# Patient Record
Sex: FEMALE | Race: WHITE | Employment: OTHER | URBAN - METROPOLITAN AREA
[De-identification: names, ages, dates, MRNs, and addresses within clinical notes are randomized per-mention and may not be internally consistent; named-entity substitution may affect disease eponyms.]

---

## 2017-03-13 ENCOUNTER — OFFICE VISIT (OUTPATIENT)
Dept: INTERNAL MEDICINE | Age: 67
End: 2017-03-13

## 2017-03-13 VITALS
SYSTOLIC BLOOD PRESSURE: 100 MMHG | RESPIRATION RATE: 12 BRPM | WEIGHT: 164 LBS | HEIGHT: 65 IN | DIASTOLIC BLOOD PRESSURE: 60 MMHG | BODY MASS INDEX: 27.32 KG/M2

## 2017-03-13 DIAGNOSIS — Z13.820 SCREENING FOR OSTEOPOROSIS: ICD-10-CM

## 2017-03-13 DIAGNOSIS — Z00.00 ROUTINE GENERAL MEDICAL EXAMINATION AT A HEALTH CARE FACILITY: Primary | ICD-10-CM

## 2017-03-13 DIAGNOSIS — E78.5 HYPERLIPIDEMIA, UNSPECIFIED HYPERLIPIDEMIA TYPE: ICD-10-CM

## 2017-03-13 DIAGNOSIS — Z12.11 SPECIAL SCREENING FOR MALIGNANT NEOPLASMS, COLON: ICD-10-CM

## 2017-03-13 DIAGNOSIS — R53.83 OTHER FATIGUE: ICD-10-CM

## 2017-03-13 DIAGNOSIS — Z00.00 PERIODIC HEALTH ASSESSMENT, GENERAL SCREENING, ADULT: ICD-10-CM

## 2017-03-13 LAB
BILIRUBIN, POC: NORMAL
BLOOD URINE, POC: NORMAL
CLARITY, POC: CLEAR
COLOR, POC: YELLOW
GLUCOSE URINE, POC: NORMAL
KETONES, POC: NORMAL
LEUKOCYTE EST, POC: NORMAL
NITRITE, POC: NORMAL
PH, POC: 5
PROTEIN, POC: NORMAL
SPECIFIC GRAVITY, POC: 1.02
UROBILINOGEN, POC: NORMAL

## 2017-03-13 PROCEDURE — 3288F FALL RISK ASSESSMENT DOCD: CPT | Performed by: INTERNAL MEDICINE

## 2017-03-13 PROCEDURE — 99397 PER PM REEVAL EST PAT 65+ YR: CPT | Performed by: INTERNAL MEDICINE

## 2017-03-13 PROCEDURE — 81002 URINALYSIS NONAUTO W/O SCOPE: CPT | Performed by: INTERNAL MEDICINE

## 2017-03-13 PROCEDURE — G8510 SCR DEP NEG, NO PLAN REQD: HCPCS | Performed by: INTERNAL MEDICINE

## 2017-03-13 PROCEDURE — 93000 ELECTROCARDIOGRAM COMPLETE: CPT | Performed by: INTERNAL MEDICINE

## 2017-03-13 ASSESSMENT — PATIENT HEALTH QUESTIONNAIRE - PHQ9
SUM OF ALL RESPONSES TO PHQ9 QUESTIONS 1 & 2: 0
1. LITTLE INTEREST OR PLEASURE IN DOING THINGS: 0
SUM OF ALL RESPONSES TO PHQ QUESTIONS 1-9: 0
2. FEELING DOWN, DEPRESSED OR HOPELESS: 0

## 2017-03-15 DIAGNOSIS — Z00.00 PERIODIC HEALTH ASSESSMENT, GENERAL SCREENING, ADULT: ICD-10-CM

## 2017-03-15 DIAGNOSIS — E78.5 HYPERLIPIDEMIA, UNSPECIFIED HYPERLIPIDEMIA TYPE: ICD-10-CM

## 2017-03-15 DIAGNOSIS — R53.83 OTHER FATIGUE: ICD-10-CM

## 2017-03-15 LAB
A/G RATIO: 1.9 (ref 1.1–2.2)
ALBUMIN SERPL-MCNC: 4.6 G/DL (ref 3.4–5)
ALP BLD-CCNC: 69 U/L (ref 40–129)
ALT SERPL-CCNC: 22 U/L (ref 10–40)
ANION GAP SERPL CALCULATED.3IONS-SCNC: 14 MMOL/L (ref 3–16)
AST SERPL-CCNC: 24 U/L (ref 15–37)
BASOPHILS ABSOLUTE: 0.1 K/UL (ref 0–0.2)
BASOPHILS RELATIVE PERCENT: 2.5 %
BILIRUB SERPL-MCNC: 0.4 MG/DL (ref 0–1)
BUN BLDV-MCNC: 18 MG/DL (ref 7–20)
CALCIUM SERPL-MCNC: 9.6 MG/DL (ref 8.3–10.6)
CHLORIDE BLD-SCNC: 105 MMOL/L (ref 99–110)
CHOLESTEROL, TOTAL: 182 MG/DL (ref 0–199)
CO2: 25 MMOL/L (ref 21–32)
CREAT SERPL-MCNC: 0.9 MG/DL (ref 0.6–1.2)
EOSINOPHILS ABSOLUTE: 0.1 K/UL (ref 0–0.6)
EOSINOPHILS RELATIVE PERCENT: 3 %
GFR AFRICAN AMERICAN: >60
GFR NON-AFRICAN AMERICAN: >60
GLOBULIN: 2.4 G/DL
GLUCOSE BLD-MCNC: 94 MG/DL (ref 70–99)
HCT VFR BLD CALC: 42.3 % (ref 36–48)
HDLC SERPL-MCNC: 86 MG/DL (ref 40–60)
HEMOGLOBIN: 13.8 G/DL (ref 12–16)
HEPATITIS C ANTIBODY INTERPRETATION: NORMAL
LDL CHOLESTEROL CALCULATED: 79 MG/DL
LYMPHOCYTES ABSOLUTE: 1.6 K/UL (ref 1–5.1)
LYMPHOCYTES RELATIVE PERCENT: 35.5 %
MCH RBC QN AUTO: 29.6 PG (ref 26–34)
MCHC RBC AUTO-ENTMCNC: 32.5 G/DL (ref 31–36)
MCV RBC AUTO: 91 FL (ref 80–100)
MONOCYTES ABSOLUTE: 0.3 K/UL (ref 0–1.3)
MONOCYTES RELATIVE PERCENT: 5.6 %
NEUTROPHILS ABSOLUTE: 2.5 K/UL (ref 1.7–7.7)
NEUTROPHILS RELATIVE PERCENT: 53.4 %
PDW BLD-RTO: 13.1 % (ref 12.4–15.4)
PLATELET # BLD: 182 K/UL (ref 135–450)
PMV BLD AUTO: 8.8 FL (ref 5–10.5)
POTASSIUM SERPL-SCNC: 4.4 MMOL/L (ref 3.5–5.1)
RBC # BLD: 4.64 M/UL (ref 4–5.2)
SODIUM BLD-SCNC: 144 MMOL/L (ref 136–145)
TOTAL CK: 120 U/L (ref 26–192)
TOTAL PROTEIN: 7 G/DL (ref 6.4–8.2)
TRIGL SERPL-MCNC: 84 MG/DL (ref 0–150)
TSH SERPL DL<=0.05 MIU/L-ACNC: 1.88 UIU/ML (ref 0.27–4.2)
VLDLC SERPL CALC-MCNC: 17 MG/DL
WBC # BLD: 4.6 K/UL (ref 4–11)

## 2017-03-16 LAB — HIV-1 AND HIV-2 ANTIBODIES: NORMAL

## 2017-06-09 ENCOUNTER — TELEPHONE (OUTPATIENT)
Dept: INTERNAL MEDICINE | Age: 67
End: 2017-06-09

## 2017-06-09 RX ORDER — SIMVASTATIN 20 MG
TABLET ORAL
Qty: 90 TABLET | Refills: 3 | Status: SHIPPED | OUTPATIENT
Start: 2017-06-09 | End: 2017-10-18 | Stop reason: ALTCHOICE

## 2017-06-21 ENCOUNTER — OFFICE VISIT (OUTPATIENT)
Dept: SLEEP MEDICINE | Age: 67
End: 2017-06-21

## 2017-06-21 VITALS
DIASTOLIC BLOOD PRESSURE: 78 MMHG | OXYGEN SATURATION: 98 % | WEIGHT: 162.8 LBS | SYSTOLIC BLOOD PRESSURE: 122 MMHG | RESPIRATION RATE: 16 BRPM | HEIGHT: 65 IN | BODY MASS INDEX: 27.12 KG/M2 | HEART RATE: 83 BPM

## 2017-06-21 DIAGNOSIS — R06.83 SNORING: Primary | ICD-10-CM

## 2017-06-21 DIAGNOSIS — R68.2 DRY MOUTH: ICD-10-CM

## 2017-06-21 DIAGNOSIS — R51.9 MORNING HEADACHE: ICD-10-CM

## 2017-06-21 DIAGNOSIS — R35.1 NOCTURIA: ICD-10-CM

## 2017-06-21 PROCEDURE — 99204 OFFICE O/P NEW MOD 45 MIN: CPT | Performed by: PSYCHIATRY & NEUROLOGY

## 2017-06-21 ASSESSMENT — SLEEP AND FATIGUE QUESTIONNAIRES
HOW LIKELY ARE YOU TO NOD OFF OR FALL ASLEEP IN A CAR, WHILE STOPPED FOR A FEW MINUTES IN TRAFFIC: 0
NECK CIRCUMFERENCE (INCHES): 14
HOW LIKELY ARE YOU TO NOD OFF OR FALL ASLEEP WHILE SITTING QUIETLY AFTER LUNCH WITHOUT ALCOHOL: 0
HOW LIKELY ARE YOU TO NOD OFF OR FALL ASLEEP WHILE SITTING AND TALKING TO SOMEONE: 0
HOW LIKELY ARE YOU TO NOD OFF OR FALL ASLEEP WHILE SITTING INACTIVE IN A PUBLIC PLACE: 0
ESS TOTAL SCORE: 0
HOW LIKELY ARE YOU TO NOD OFF OR FALL ASLEEP WHILE SITTING AND READING: 0
HOW LIKELY ARE YOU TO NOD OFF OR FALL ASLEEP WHILE LYING DOWN TO REST IN THE AFTERNOON WHEN CIRCUMSTANCES PERMIT: 0
HOW LIKELY ARE YOU TO NOD OFF OR FALL ASLEEP WHILE WATCHING TV: 0
HOW LIKELY ARE YOU TO NOD OFF OR FALL ASLEEP WHEN YOU ARE A PASSENGER IN A CAR FOR AN HOUR WITHOUT A BREAK: 0

## 2017-06-21 ASSESSMENT — ENCOUNTER SYMPTOMS
RESPIRATORY NEGATIVE: 1
GASTROINTESTINAL NEGATIVE: 1
EYES NEGATIVE: 1
ALLERGIC/IMMUNOLOGIC NEGATIVE: 1

## 2017-06-27 ENCOUNTER — TELEPHONE (OUTPATIENT)
Dept: PHARMACY | Facility: CLINIC | Age: 67
End: 2017-06-27

## 2017-07-05 ENCOUNTER — TELEPHONE (OUTPATIENT)
Dept: INTERNAL MEDICINE | Age: 67
End: 2017-07-05

## 2017-09-28 ENCOUNTER — HOSPITAL ENCOUNTER (OUTPATIENT)
Dept: OTHER | Age: 67
Discharge: HOME | End: 2017-09-28
Attending: INTERNAL MEDICINE | Admitting: INTERNAL MEDICINE

## 2017-09-28 ENCOUNTER — TELEPHONE (OUTPATIENT)
Dept: INTERNAL MEDICINE | Age: 67
End: 2017-09-28

## 2017-09-28 DIAGNOSIS — R52 PAIN: ICD-10-CM

## 2017-09-28 DIAGNOSIS — T14.90XA TRAUMA: ICD-10-CM

## 2017-09-28 DIAGNOSIS — R52 PAIN: Primary | ICD-10-CM

## 2017-10-13 ENCOUNTER — TELEPHONE (OUTPATIENT)
Dept: PHARMACY | Facility: CLINIC | Age: 67
End: 2017-10-13

## 2017-10-13 NOTE — TELEPHONE ENCOUNTER
CLINICAL PHARMACY NOTE - Adherence Review    Identified care gap per [employer]: simvastatin adherence  Per records, appears 90-day supply last filled on 06/09/2017  Additional notes: patient picked up on 06/12/2017    First attempt made to reach patient by telephone regarding adherence. Left voice message for patient to return clinician's phone call to [phone] or toll free [phone] option 7. Will continue to attempt to contact patient by telephone as appropriate.     Tammy Jose, PharmD  Clinical Pharmacy Specialist  O: [phone]  C: [address], [address]

## 2017-10-13 NOTE — LETTER
[address]  [address], [place]  Phone: [phone], option 7  Fax: [address]   [address] [place] [address]           10/16/17     Dear Suzette Kapoor,    We tried to reach you recently regarding your simvastatin, but were unable to reach you on the telephone. It appears that this medication has not been filled at proper times. We are concerned you may be missing doses or not taking it as directed. It is important that you take your medications regularly and try not to miss a single dose. We have on file that you are currently taking simvastatin 20mg once daily. If you are no longer taking this, or taking it differently, please call us at the number below so that we can discuss this and update your medication profile. Some ways to help you remember to take your medications are to use a pill box, set an alarm, and/or keep your medication near something that you do every day.     Sincerely,     Heaven Osorio, PharmD  [address]  Phone: [phone], option 7

## 2017-10-18 RX ORDER — PRAVASTATIN SODIUM 40 MG
40 TABLET ORAL DAILY
Qty: 90 TABLET | Refills: 3 | Status: SHIPPED | OUTPATIENT
Start: 2017-10-18 | End: 2019-08-29

## 2017-10-18 NOTE — TELEPHONE ENCOUNTER
CLINICAL PHARMACY NOTE - Adherence Review    Call placed to patient - notified her that Dr. Violetta Cotter approved pravastatin prescription and that it was sent to her pharmacy. Provided her with writer's contact information if she has any further questions.      Alexandr Iglesias, PharmD  Clinical Pharmacy Specialist  O: [phone]  C: [address], [address]

## 2017-12-14 RX ORDER — SIMVASTATIN 20 MG
20 TABLET ORAL NIGHTLY
COMMUNITY
End: 2017-12-14 | Stop reason: SDUPTHER

## 2017-12-14 RX ORDER — SIMVASTATIN 20 MG
20 TABLET ORAL NIGHTLY
Qty: 90 TABLET | Refills: 3 | Status: SHIPPED | OUTPATIENT
Start: 2017-12-14 | End: 2019-02-28 | Stop reason: SDUPTHER

## 2018-01-22 ENCOUNTER — TELEPHONE (OUTPATIENT)
Dept: INTERNAL MEDICINE | Age: 68
End: 2018-01-22

## 2018-01-22 NOTE — TELEPHONE ENCOUNTER
For 4 days so much coughing but can't get the congestion out of her chest   On mucinex   She is tired and just wants to sleep   [facility]  RX# [phone]

## 2018-01-24 ENCOUNTER — TELEPHONE (OUTPATIENT)
Dept: INTERNAL MEDICINE | Age: 68
End: 2018-01-24

## 2018-01-24 DIAGNOSIS — R05.9 COUGH: Primary | ICD-10-CM

## 2018-01-25 ENCOUNTER — TELEPHONE (OUTPATIENT)
Dept: INTERNAL MEDICINE | Age: 68
End: 2018-01-25

## 2018-01-25 NOTE — TELEPHONE ENCOUNTER
Fine during the day. Just at night starts with a hacking cough. Can't get out the phlegm. She slept fine. Has been taking Robitussin DM regularly. Feels okay but this cough starts towards evening. Doesn't feel that an x-ray is needed - a waste of money.

## 2018-01-26 ENCOUNTER — HOSPITAL ENCOUNTER (OUTPATIENT)
Dept: OTHER | Age: 68
Discharge: HOME | End: 2018-01-26
Attending: INTERNAL MEDICINE | Admitting: INTERNAL MEDICINE

## 2018-01-26 DIAGNOSIS — R05.9 COUGH: ICD-10-CM

## 2018-04-23 ENCOUNTER — OFFICE VISIT (OUTPATIENT)
Dept: INTERNAL MEDICINE | Age: 68
End: 2018-04-23

## 2018-04-23 VITALS
HEIGHT: 65 IN | WEIGHT: 162 LBS | HEART RATE: 90 BPM | BODY MASS INDEX: 26.99 KG/M2 | SYSTOLIC BLOOD PRESSURE: 110 MMHG | RESPIRATION RATE: 12 BRPM | DIASTOLIC BLOOD PRESSURE: 78 MMHG

## 2018-04-23 DIAGNOSIS — Z13.820 SCREENING FOR OSTEOPOROSIS: ICD-10-CM

## 2018-04-23 DIAGNOSIS — Z00.00 MEDICARE ANNUAL WELLNESS VISIT, SUBSEQUENT: Primary | ICD-10-CM

## 2018-04-23 DIAGNOSIS — Z00.00 PERIODIC HEALTH ASSESSMENT, GENERAL SCREENING, ADULT: ICD-10-CM

## 2018-04-23 DIAGNOSIS — Z13.29 SCREENING FOR THYROID DISORDER: ICD-10-CM

## 2018-04-23 DIAGNOSIS — Z78.0 MENOPAUSE: ICD-10-CM

## 2018-04-23 DIAGNOSIS — Z12.11 SPECIAL SCREENING FOR MALIGNANT NEOPLASMS, COLON: ICD-10-CM

## 2018-04-23 DIAGNOSIS — E78.5 HYPERLIPIDEMIA, UNSPECIFIED HYPERLIPIDEMIA TYPE: ICD-10-CM

## 2018-04-23 PROCEDURE — G0439 PPPS, SUBSEQ VISIT: HCPCS | Performed by: INTERNAL MEDICINE

## 2018-04-23 PROCEDURE — 81002 URINALYSIS NONAUTO W/O SCOPE: CPT | Performed by: INTERNAL MEDICINE

## 2018-04-23 PROCEDURE — 93000 ELECTROCARDIOGRAM COMPLETE: CPT | Performed by: INTERNAL MEDICINE

## 2018-04-23 ASSESSMENT — LIFESTYLE VARIABLES
AUDIT-C TOTAL SCORE: 3
HOW OFTEN DURING THE LAST YEAR HAVE YOU BEEN UNABLE TO REMEMBER WHAT HAPPENED THE NIGHT BEFORE BECAUSE YOU HAD BEEN DRINKING: 0
HOW MANY STANDARD DRINKS CONTAINING ALCOHOL DO YOU HAVE ON A TYPICAL DAY: 0
HOW OFTEN DURING THE LAST YEAR HAVE YOU NEEDED AN ALCOHOLIC DRINK FIRST THING IN THE MORNING TO GET YOURSELF GOING AFTER A NIGHT OF HEAVY DRINKING: 0
HOW OFTEN DURING THE LAST YEAR HAVE YOU FAILED TO DO WHAT WAS NORMALLY EXPECTED FROM YOU BECAUSE OF DRINKING: 0
AUDIT TOTAL SCORE: 3
HAS A RELATIVE, FRIEND, DOCTOR, OR ANOTHER HEALTH PROFESSIONAL EXPRESSED CONCERN ABOUT YOUR DRINKING OR SUGGESTED YOU CUT DOWN: 0
HOW OFTEN DURING THE LAST YEAR HAVE YOU HAD A FEELING OF GUILT OR REMORSE AFTER DRINKING: 0
HOW OFTEN DO YOU HAVE SIX OR MORE DRINKS ON ONE OCCASION: 0
HOW OFTEN DO YOU HAVE A DRINK CONTAINING ALCOHOL: 3
HOW OFTEN DURING THE LAST YEAR HAVE YOU FOUND THAT YOU WERE NOT ABLE TO STOP DRINKING ONCE YOU HAD STARTED: 0
HAVE YOU OR SOMEONE ELSE BEEN INJURED AS A RESULT OF YOUR DRINKING: 0

## 2018-04-23 ASSESSMENT — PATIENT HEALTH QUESTIONNAIRE - PHQ9: SUM OF ALL RESPONSES TO PHQ QUESTIONS 1-9: 0

## 2018-04-23 ASSESSMENT — ANXIETY QUESTIONNAIRES: GAD7 TOTAL SCORE: 0

## 2018-05-14 ENCOUNTER — HOSPITAL ENCOUNTER (OUTPATIENT)
Dept: GENERAL RADIOLOGY | Age: 68
Discharge: HOME | End: 2018-05-14
Attending: INTERNAL MEDICINE | Admitting: INTERNAL MEDICINE

## 2018-05-14 DIAGNOSIS — Z78.0 ASYMPTOMATIC MENOPAUSAL STATE: ICD-10-CM

## 2018-05-14 DIAGNOSIS — Z78.0 MENOPAUSE: ICD-10-CM

## 2018-05-14 DIAGNOSIS — Z13.820 SCREENING FOR OSTEOPOROSIS: ICD-10-CM

## 2018-05-22 DIAGNOSIS — Z12.11 SPECIAL SCREENING FOR MALIGNANT NEOPLASMS, COLON: ICD-10-CM

## 2018-05-22 LAB
CONTROL: NORMAL
HEMOCCULT STL QL: NORMAL

## 2018-05-22 PROCEDURE — 82274 ASSAY TEST FOR BLOOD FECAL: CPT | Performed by: INTERNAL MEDICINE

## 2018-06-06 ENCOUNTER — NURSE TRIAGE (OUTPATIENT)
Dept: OTHER | Facility: CLINIC | Age: 68
End: 2018-06-06

## 2018-06-06 LAB
ALBUMIN SERPL-MCNC: 4.3 G/DL
ALP BLD-CCNC: 65 U/L
ALT SERPL-CCNC: 16 U/L
ANION GAP SERPL CALCULATED.3IONS-SCNC: NORMAL MMOL/L
AST SERPL-CCNC: 21 U/L
BASOPHILS ABSOLUTE: 0 /ΜL
BASOPHILS RELATIVE PERCENT: 1 %
BILIRUB SERPL-MCNC: 0.3 MG/DL (ref 0.1–1.4)
BUN BLDV-MCNC: 28 MG/DL
CALCIUM SERPL-MCNC: 9.5 MG/DL
CHLORIDE BLD-SCNC: 103 MMOL/L
CHOLESTEROL, TOTAL: 170 MG/DL
CHOLESTEROL/HDL RATIO: NORMAL
CO2: 23 MMOL/L
CREAT SERPL-MCNC: 0.97 MG/DL
EOSINOPHILS ABSOLUTE: 0.2 /ΜL
EOSINOPHILS RELATIVE PERCENT: 4 %
GFR CALCULATED: NORMAL
GLUCOSE BLD-MCNC: 89 MG/DL
HCT VFR BLD CALC: 40.2 % (ref 36–46)
HDLC SERPL-MCNC: 70 MG/DL (ref 35–70)
HEMOGLOBIN: 13.1 G/DL (ref 12–16)
LDL CHOLESTEROL CALCULATED: 82 MG/DL (ref 0–160)
LYMPHOCYTES ABSOLUTE: 1.9 /ΜL
LYMPHOCYTES RELATIVE PERCENT: 44 %
MCH RBC QN AUTO: 29.5 PG
MCHC RBC AUTO-ENTMCNC: 32.6 G/DL
MCV RBC AUTO: 91 FL
MONOCYTES ABSOLUTE: 0.3 /ΜL
MONOCYTES RELATIVE PERCENT: 7 %
NEUTROPHILS ABSOLUTE: 1.9 /ΜL
NEUTROPHILS RELATIVE PERCENT: 44 %
PDW BLD-RTO: 13.3 %
PLATELET # BLD: 198 K/ΜL
PMV BLD AUTO: NORMAL FL
POTASSIUM SERPL-SCNC: 4.5 MMOL/L
RBC # BLD: 4.44 10^6/ΜL
SODIUM BLD-SCNC: 140 MMOL/L
TOTAL CK: 132 U/L
TOTAL PROTEIN: 6.6
TRIGL SERPL-MCNC: 90 MG/DL
TSH SERPL DL<=0.05 MIU/L-ACNC: 1.8 UIU/ML
VLDLC SERPL CALC-MCNC: 18 MG/DL
WBC # BLD: 4.4 10^3/ML

## 2018-06-08 DIAGNOSIS — E78.5 HYPERLIPIDEMIA, UNSPECIFIED HYPERLIPIDEMIA TYPE: ICD-10-CM

## 2018-06-08 DIAGNOSIS — Z13.29 SCREENING FOR THYROID DISORDER: ICD-10-CM

## 2018-09-30 NOTE — ADDENDUM NOTE
Encounter addended by: Magdy Toney on: 10/17/2017  7:06 AM<BR>    Actions taken: Letter status changed DISPLAY PLAN FREE TEXT

## 2018-11-21 ENCOUNTER — TELEPHONE (OUTPATIENT)
Dept: INTERNAL MEDICINE CLINIC | Age: 68
End: 2018-11-21

## 2019-02-28 RX ORDER — SIMVASTATIN 20 MG
TABLET ORAL
Qty: 90 TABLET | Refills: 2 | Status: SHIPPED | OUTPATIENT
Start: 2019-02-28 | End: 2020-01-27

## 2019-08-29 ENCOUNTER — TELEPHONE (OUTPATIENT)
Dept: PHARMACY | Facility: CLINIC | Age: 69
End: 2019-08-29

## 2019-08-29 ENCOUNTER — OFFICE VISIT (OUTPATIENT)
Dept: INTERNAL MEDICINE CLINIC | Age: 69
End: 2019-08-29
Payer: MEDICARE

## 2019-08-29 VITALS
WEIGHT: 161.4 LBS | RESPIRATION RATE: 12 BRPM | HEIGHT: 65 IN | DIASTOLIC BLOOD PRESSURE: 66 MMHG | SYSTOLIC BLOOD PRESSURE: 112 MMHG | BODY MASS INDEX: 26.89 KG/M2 | HEART RATE: 100 BPM

## 2019-08-29 DIAGNOSIS — E78.5 HYPERLIPIDEMIA, UNSPECIFIED HYPERLIPIDEMIA TYPE: ICD-10-CM

## 2019-08-29 DIAGNOSIS — Z00.00 MEDICARE ANNUAL WELLNESS VISIT, SUBSEQUENT: Primary | ICD-10-CM

## 2019-08-29 DIAGNOSIS — Z13.29 SCREENING FOR THYROID DISORDER: ICD-10-CM

## 2019-08-29 DIAGNOSIS — Z12.11 SPECIAL SCREENING FOR MALIGNANT NEOPLASMS, COLON: ICD-10-CM

## 2019-08-29 PROCEDURE — G0439 PPPS, SUBSEQ VISIT: HCPCS | Performed by: INTERNAL MEDICINE

## 2019-08-29 PROCEDURE — 81002 URINALYSIS NONAUTO W/O SCOPE: CPT | Performed by: INTERNAL MEDICINE

## 2019-08-29 PROCEDURE — 93000 ELECTROCARDIOGRAM COMPLETE: CPT | Performed by: INTERNAL MEDICINE

## 2019-08-29 ASSESSMENT — LIFESTYLE VARIABLES
HOW OFTEN DURING THE LAST YEAR HAVE YOU HAD A FEELING OF GUILT OR REMORSE AFTER DRINKING: 0
AUDIT-C TOTAL SCORE: 3
HOW OFTEN DURING THE LAST YEAR HAVE YOU BEEN UNABLE TO REMEMBER WHAT HAPPENED THE NIGHT BEFORE BECAUSE YOU HAD BEEN DRINKING: 0
HOW OFTEN DURING THE LAST YEAR HAVE YOU NEEDED AN ALCOHOLIC DRINK FIRST THING IN THE MORNING TO GET YOURSELF GOING AFTER A NIGHT OF HEAVY DRINKING: 0
HOW MANY STANDARD DRINKS CONTAINING ALCOHOL DO YOU HAVE ON A TYPICAL DAY: 0
HAS A RELATIVE, FRIEND, DOCTOR, OR ANOTHER HEALTH PROFESSIONAL EXPRESSED CONCERN ABOUT YOUR DRINKING OR SUGGESTED YOU CUT DOWN: 0
HOW OFTEN DO YOU HAVE SIX OR MORE DRINKS ON ONE OCCASION: 0
HOW OFTEN DURING THE LAST YEAR HAVE YOU FOUND THAT YOU WERE NOT ABLE TO STOP DRINKING ONCE YOU HAD STARTED: 0
HAVE YOU OR SOMEONE ELSE BEEN INJURED AS A RESULT OF YOUR DRINKING: 0
HOW OFTEN DURING THE LAST YEAR HAVE YOU FAILED TO DO WHAT WAS NORMALLY EXPECTED FROM YOU BECAUSE OF DRINKING: 0
HOW OFTEN DO YOU HAVE A DRINK CONTAINING ALCOHOL: 3
AUDIT TOTAL SCORE: 3

## 2019-08-29 ASSESSMENT — PATIENT HEALTH QUESTIONNAIRE - PHQ9
SUM OF ALL RESPONSES TO PHQ QUESTIONS 1-9: 0
SUM OF ALL RESPONSES TO PHQ QUESTIONS 1-9: 0

## 2019-09-22 ENCOUNTER — TELEPHONE (OUTPATIENT)
Dept: INTERNAL MEDICINE CLINIC | Age: 69
End: 2019-09-22

## 2019-09-26 LAB
ALBUMIN SERPL-MCNC: 4.5 G/DL
ALP BLD-CCNC: 69 U/L
ALT SERPL-CCNC: 16 U/L
ANION GAP SERPL CALCULATED.3IONS-SCNC: NORMAL MMOL/L
AST SERPL-CCNC: 21 U/L
BASOPHILS ABSOLUTE: 0.1 /ΜL
BASOPHILS RELATIVE PERCENT: 1 %
BILIRUB SERPL-MCNC: 0.3 MG/DL (ref 0.1–1.4)
BUN BLDV-MCNC: 26 MG/DL
CALCIUM SERPL-MCNC: 9.7 MG/DL
CHLORIDE BLD-SCNC: 105 MMOL/L
CHOLESTEROL, TOTAL: 177 MG/DL
CHOLESTEROL/HDL RATIO: ABNORMAL
CO2: 23 MMOL/L
CREAT SERPL-MCNC: 1.09 MG/DL
EOSINOPHILS ABSOLUTE: 0.2 /ΜL
EOSINOPHILS RELATIVE PERCENT: 5 %
GFR CALCULATED: NORMAL
GLUCOSE BLD-MCNC: 95 MG/DL
HCT VFR BLD CALC: 38.8 % (ref 36–46)
HDLC SERPL-MCNC: 77 MG/DL (ref 35–70)
HEMOGLOBIN: 13.6 G/DL (ref 12–16)
LDL CHOLESTEROL CALCULATED: 85 MG/DL (ref 0–160)
LYMPHOCYTES ABSOLUTE: 1.5 /ΜL
LYMPHOCYTES RELATIVE PERCENT: 36 %
MCH RBC QN AUTO: 30.2 PG
MCHC RBC AUTO-ENTMCNC: 35.1 G/DL
MCV RBC AUTO: 86 FL
MONOCYTES ABSOLUTE: 0.3 /ΜL
MONOCYTES RELATIVE PERCENT: 6 %
NEUTROPHILS ABSOLUTE: 2.1 /ΜL
NEUTROPHILS RELATIVE PERCENT: 52 %
PDW BLD-RTO: 13 %
PLATELET # BLD: 199 K/ΜL
PMV BLD AUTO: NORMAL FL
POTASSIUM SERPL-SCNC: 4.5 MMOL/L
RBC # BLD: 4.5 10^6/ΜL
SODIUM BLD-SCNC: 143 MMOL/L
TOTAL CK: 132 U/L
TOTAL PROTEIN: 6.6
TRIGL SERPL-MCNC: 74 MG/DL
TSH SERPL DL<=0.05 MIU/L-ACNC: 2.05 UIU/ML
VLDLC SERPL CALC-MCNC: 15 MG/DL
WBC # BLD: 4.1 10^3/ML

## 2019-09-30 DIAGNOSIS — Z12.11 SPECIAL SCREENING FOR MALIGNANT NEOPLASMS, COLON: ICD-10-CM

## 2019-09-30 LAB
CONTROL: NORMAL
HEMOCCULT STL QL: NORMAL

## 2019-09-30 PROCEDURE — 82274 ASSAY TEST FOR BLOOD FECAL: CPT | Performed by: INTERNAL MEDICINE

## 2019-10-07 ENCOUNTER — TELEPHONE (OUTPATIENT)
Dept: PHARMACY | Facility: CLINIC | Age: 69
End: 2019-10-07

## 2019-10-10 DIAGNOSIS — E78.5 HYPERLIPIDEMIA, UNSPECIFIED HYPERLIPIDEMIA TYPE: ICD-10-CM

## 2019-10-10 DIAGNOSIS — Z13.29 SCREENING FOR THYROID DISORDER: ICD-10-CM

## 2020-01-27 RX ORDER — SIMVASTATIN 20 MG
TABLET ORAL
Qty: 90 TABLET | Refills: 3 | Status: SHIPPED | OUTPATIENT
Start: 2020-01-27 | End: 2021-01-26 | Stop reason: SDUPTHER

## 2020-06-25 ENCOUNTER — TELEPHONE (OUTPATIENT)
Dept: INTERNAL MEDICINE CLINIC | Age: 70
End: 2020-06-25

## 2020-07-27 ENCOUNTER — TELEPHONE (OUTPATIENT)
Dept: INTERNAL MEDICINE CLINIC | Age: 70
End: 2020-07-27

## 2020-07-27 NOTE — TELEPHONE ENCOUNTER
She will get x-rays on Friday.   Can she get a muscle relaxer (she said she was actually just taking tylenol and not a true muscle relaxer)  [facility] - [phone]

## 2020-07-27 NOTE — TELEPHONE ENCOUNTER
X 7-10 days   Woke up - left shoulder, neck area aching terribly. Then feels like needles and pins down left arm. Constant, dull aching. Took a muscle relaxer, lidocaine patches. Going out of town to visit her son for 3 days tomorrow.

## 2020-07-28 ENCOUNTER — HOSPITAL ENCOUNTER (OUTPATIENT)
Dept: GENERAL RADIOLOGY | Age: 70
Discharge: HOME | End: 2020-07-28
Payer: MEDICARE

## 2020-07-28 ENCOUNTER — HOSPITAL ENCOUNTER (OUTPATIENT)
Age: 70
Discharge: HOME | End: 2020-07-28
Payer: MEDICARE

## 2020-07-28 PROCEDURE — 73030 X-RAY EXAM OF SHOULDER: CPT

## 2020-07-28 PROCEDURE — 72040 X-RAY EXAM NECK SPINE 2-3 VW: CPT

## 2020-07-29 ENCOUNTER — OFFICE VISIT (OUTPATIENT)
Dept: ORTHOPEDIC SURGERY | Age: 70
End: 2020-07-29
Payer: MEDICARE

## 2020-07-29 ENCOUNTER — TELEPHONE (OUTPATIENT)
Dept: INTERNAL MEDICINE CLINIC | Age: 70
End: 2020-07-29

## 2020-07-29 VITALS — WEIGHT: 161.38 LBS | BODY MASS INDEX: 26.89 KG/M2 | TEMPERATURE: 97.3 F | HEIGHT: 65 IN

## 2020-07-29 PROCEDURE — 99203 OFFICE O/P NEW LOW 30 MIN: CPT | Performed by: ORTHOPAEDIC SURGERY

## 2020-07-29 RX ORDER — MELOXICAM 7.5 MG/1
7.5 TABLET ORAL DAILY
Qty: 30 TABLET | Refills: 2 | Status: SHIPPED | OUTPATIENT
Start: 2020-07-29 | End: 2020-08-27

## 2020-07-29 RX ORDER — METHYLPREDNISOLONE 4 MG/1
TABLET ORAL
Qty: 1 KIT | Refills: 0 | Status: SHIPPED | OUTPATIENT
Start: 2020-07-29 | End: 2020-08-04 | Stop reason: ALTCHOICE

## 2020-07-29 ASSESSMENT — ENCOUNTER SYMPTOMS: BACK PAIN: 1

## 2020-07-29 NOTE — PROGRESS NOTES
Review of Systems   Genitourinary:        Kidney stones / kidney disease    Musculoskeletal: Positive for back pain and neck pain. All other systems reviewed and are negative.

## 2020-07-29 NOTE — PROGRESS NOTES
[facility]  History and Physical  Shoulder Pain    Date:  2020    Name:  Kameron Gonzalez  Address:  [address] [place] [id number]    :  1950      Age:   72 y.o. Medical Record Number:  <[id number]>    Reason for Visit:    New Patient (left shoulder )      HPI:   Kameron Gonzalez is a very pleasant 72 y.o. female who presents to our office today with complaints of left shoulder pain. She has been kindly referred by her primary care physician Dr. Luisa Drummond for consultation regarding her left shoulder. Mrs. Dieudonne Mena describes a 2 year history of left shoulder \"discomfort\" which has gradually progressed, is aching in quality, is relatively mild in severity, and is exacerbated by physical activity while being alleviated by rest.      However, 7 to 10 days ago she experienced an atraumatic insidious onset exacerbation of left shoulder pain which prompted her to seek medical attention from Dr. Luisa Drummond. This subacute pain is described as moderate to severe in severity, aching and throbbing in quality, and localized from the left paraspinal region of her neck down laterally to just medial to the acromion. She describes a more dull pain that begins t around the superior aspect of the acromion and traveling down the lateral aspect of her upper arm. She furthermore describes a tingliness which travels from her left shoulder down to her fingers, although she cannot be more specific in localizing which fingers or which aspects of her forearm are experiencing this paresthesia. Pain Assessment  Location of Pain: Shoulder  Location Modifiers: Left  Severity of Pain: 4  Quality of Pain: Aching, Dull, Other (Comment)(tingling)  Duration of Pain: Persistent  Frequency of Pain: Constant  Aggravating Factors:  Other (Comment)(almost everything)  Relieving Factors: Nsaids, Rest(acetaminophen)  Result of Injury: No  Work-Related Injury: No  Are there other pain MOUTH ONCE NIGHTLY 90 tablet 3     No current facility-administered medications on file prior to visit. Social History     Socioeconomic History    Marital status:      Spouse name: Dorothea Horton Number of children: 10    Years of education: Not on file    Highest education level: Not on file   Occupational History    Occupation: works at [employer] strain: Not on file    Food insecurity     Worry: Not on file     Inability: Not on file   [employer] needs     Medical: Not on file     Non-medical: Not on file   Tobacco Use    Smoking status: Former Smoker     Packs/day: 1.00     Years: 20.00     Pack years: 20.00     Types: Cigarettes     Last attempt to quit: 1993     Years since quittin.5    Smokeless tobacco: Never Used   Substance and Sexual Activity    Alcohol use: Yes     Alcohol/week: 0.0 standard drinks     Comment: occasional    Drug use: Not on file    Sexual activity: Not on file   Lifestyle    Physical activity     Days per week: Not on file     Minutes per session: Not on file    Stress: Not on file   Relationships    Social connections     Talks on phone: Not on file     Gets together: Not on file     Attends Adventism service: Not on file     Active member of club or organization: Not on file     Attends meetings of clubs or organizations: Not on file     Relationship status: Not on file    Intimate partner violence     Fear of current or ex partner: Not on file     Emotionally abused: Not on file     Physically abused: Not on file     Forced sexual activity: Not on file   Other Topics Concern    Not on file   Social History Narrative    Living Will:  Yes. Caffeine:        SODA - 0          TEA - 0        COFFEE - 1-2 cups a day     Family History   Problem Relation Age of Onset    Heart Attack Father 52        , MI.    Hypertension Mother 80        , hypertension, dementia.        Current Medications: Current Outpatient Medications   Medication Sig Dispense Refill    simvastatin (ZOCOR) 20 MG tablet TAKE ONE TABLET BY MOUTH ONCE NIGHTLY 90 tablet 3     No current facility-administered medications for this visit. Allergies:  No Known Allergies    Physical Exam:  Vitals:    07/29/20 1414   Temp: 97.3 °F (36.3 °C)     General: Brennon Waters is a healthy and well appearing 72 y.o. female who is sitting comfortably in our office in no acute distress. General Exam:   Constitutional: Patient is adequately groomed with no evidence of malnutrition    Neuro: alert. Oriented X 3  Eyes: Extra-ocular muscles intact  Mouth: Oral mucosa moist. No perioral lesions  Pulm: Respirations unlabored and regular. Left shoulder Exam:  Inspection: No erythema, ecchymosis, lacerations/abrasions, gross deformities, or gross signs of infection. Palpation:  No significant tenderness to palpation of the [facility] joint, greater tuberosity, bicipital tendon. No tenderness with palpation of the midline spinal processes, bilateral paraspinal musculature, or trapezius muscles bilaterally. Range of Motion:   Active forward elevation: 150 degrees  Active and passive abduction: 130 and 140 degrees. Active external rotation with elbow at side 45 degrees  Active internal rotation to the back T12     Neck ROM: Reduced extension and bilateral rotation. No complaints of pain with range of motion of the neck. Strength: 4-/5 with resistance to abduction, 4/5 with resistance to external rotation, 4+/5 with resistance to internal rotation, 4-/5 Champagne Toast test    Special Tests: Positive Chad's. Negative Speeds and Yerguson's tests. Negative Conterras test.     Neurovascular:   Sensation 2/2 in distributions of C4-T2. Gross motor function intact of the distributions consistent with C5-T1.   2+ palpable radial pulse.       Comparison: Right shoulder Exam:  Inspection: No erythema, ecchymosis, lacerations/abrasions, gross deformities, or gross signs of infection. Palpation:  No tenderness to palpation of the [facility] joint, greater tuberosity, bicipital tendon    Range of Motion:   Active forward elevation: 160 degrees  Active and passive abduction: 150 and 160 degrees. Active external rotation with elbow at side 60 degrees  Active internal rotation to the back T10     Strength: 4+/5 with resistance to abduction, 4+/5 with resistance to external rotation, 5/5 with resistance to internal rotation, 4+/5 Champagne Toast test    Special Tests: Negative Chad's. Negative Speeds and Yerguson's tests. Neurovascular: Bilaterally symmetrical neurovascular examination as described above      Laboratory:  No visits with results within 14 Day(s) from this visit.    Latest known visit with results is:   Abstract on 10/10/2019   Component Date Value    Total CK 09/25/2019 132     TSH 09/25/2019 2.050     Cholesterol, Total 09/25/2019 177     HDL 09/25/2019 77*    LDL Calculated 09/25/2019 85     Triglycerides 09/25/2019 74     VLDL 09/25/2019 15     Sodium 09/25/2019 143     Chloride 09/25/2019 105     Potassium 09/25/2019 4.5     BUN 09/25/2019 26     CREATININE 09/25/2019 1.09     Glucose 09/25/2019 95     AST 09/25/2019 21     ALT 09/25/2019 16     Calcium 09/25/2019 9.7     Total Protein 09/25/2019 6.6     CO2 09/25/2019 23     Alb 09/25/2019 4.5     Alkaline Phosphatase 09/25/2019 69     Total Bilirubin 09/25/2019 0.3     WBC 09/25/2019 4.1     RBC 09/25/2019 4.50     Hemoglobin 09/25/2019 13.6     Hematocrit 09/25/2019 38.8     MCV 09/25/2019 86     MCH 09/25/2019 30.2     MCHC 09/25/2019 35.1     Platelets 59/95/4427 199     RDW 09/25/2019 13.0     Neutrophils % 09/25/2019 52     Lymphocytes % 09/25/2019 36     Monocytes % 09/25/2019 6     Eosinophils % 09/25/2019 5     Basophils % 09/25/2019 1     Neutrophils Absolute 09/25/2019 2.1     Lymphocytes Absolute 09/25/2019 1.5     Monocytes Absolute 09/25/2019 0.3     Eosinophils Absolute 09/25/2019 0.2     Basophils Absolute 09/25/2019 0.1       No results found for this or any previous visit (from the past 24 hour(s)). Radiographic:  Prior shoulder and C-spine x-rays are available from yesterday. Radiographic Impression: Advanced degenerative disc disease throughout the C-spine. Mild to moderate osteoarthritis of the left shoulder. Self assessment questionnaires including ASES and Simple Shoulder Test were completed today. Mendez Chung is a 72 y.o. female whose overall clinical picture is consistent with 2 superimposed diagnoses: Mild-moderate osteoarthritis of the left shoulder responsible for her chronic course of gradually progressive but relatively mild left shoulder pain, and a subacute onset of left upper extremity neuropathy consistent with the distributions of C4-T1 in the context of severe cervical spine degenerative disc disease. In addition to initiating conservative management to potentially benefit the symptomatology of both diagnoses, we have referred her to Dr. Juwan Mora for evaluation and management of her C-spine DDD and advised/prescribed additional conservative management for her left shoulder osteoarthritis. Impression:  Encounter Diagnoses   Name Primary?     Cervical spine pain Yes    Osteoarthritis of left shoulder, unspecified osteoarthritis type     Degenerative disc disease, cervical        Office Procedures:  Orders Placed This Encounter   Procedures   May Bedolla MD, Spine Surgery, [facility]     Referral Priority:   Routine     Referral Type:   Eval and Treat     Referral Reason:   Specialty Services Required     Referred to Provider:   Ubaldo Daniel MD     Requested Specialty:   Pain Management     Number of Visits Requested:   1    [facility] PT - [facility]     Referral Priority:   Routine     Referral Type:   Eval and Treat     Referral Reason:   Specialty Services Required Requested Specialty:   Physical Therapy     Number of Visits Requested:   1         Plan  -Refer to Dr. Randolph Alvarez for conservative evaluation and management of his C-spine DDD  -Medrol Dosepak to rapidly address the inflammatory component of both diagnoses  -Physical therapy for left shoulder osteoarthritis  -Voltaren topical NSAID  -Meloxicam p.o. NSAID  -Activity modification by limiting activities which provoke pain in her left shoulder or radiculopathy of the left upper extremity  -Follow-up in 1 month for clinical reevaluation and consideration of a left shoulder cortisone injection if she has not shown significant improvement for her shoulder      7/29/2020  3:05 PM      Marisa Zelaya MD  Fellow of [address] [place] [address] and [address] [place] [address]    During this examination, Maria Teresa Santos, fellow of orthopaedic surgery, functioned as a scribe for Dr. Khalif Modi. This dictation was performed with a verbal recognition program (DRAGON) and it was checked for errors. It is possible that there are still dictated errors within this office note. If so, please bring any errors to my attention for an addendum. All efforts were made to ensure that this office note is accurate.  ______________  I was physically present and personally supervised the Orthopaedic Sports Medicine Fellow in the evaluation and development of a treatment plan for this patient. I personally interviewed the patient and performed a physical examination. In addition, I discussed the patient's condition and treatment options with them. I have also reviewed and agree with the past medical, family and social history unless otherwise noted. All of the patient's questions were answered. Fernanda Modi MD, PhD  7/29/2020

## 2020-07-29 NOTE — TELEPHONE ENCOUNTER
Talked to patient. She decided to hold off on the steroids for now. Dr. Oanh Tolbert is able to see her today at 2:30.

## 2020-07-29 NOTE — LETTER
Shoulder Elbow Rehabilitation Referral    Patient Name: Laya Shaikh      YOB: 1950    Diagnosis: Left shoulder moderate osteoarthritis. Precautions: Note the patient also has C-spine degenerative disc disease responsible for left upper extremity paresthesia/radiculopathy which will be addressed by Dr. Verito Grimm    Date of Prescription: July 29, 2020      Stretching:     Strengthening:  [x] Four quadrant (FE, ER, IR, CBA)  [] Sleeper stretch    [x] Rotator cuff (ER, IR, Abd)  [x] Forward Elevation    [x] External Rotators     [x] External Rotation    [x] Internal Rotators  [x] Internal Rotation: up/back   [x] Abductors     [x] Internal Rotation: supine in abduction [x] Flexors  [x] Cross-body abduction    [x] Extensors  [] Pendulum (FE, Abd/Add, cw/ccw)  [x] Scapular Stabilizers   [] Wall-walking (FE, Abd)    [] Shoulder shrugs     [] Table slides      [] Rhomboid pinch  [x] Elbow (flex, ext, pron, sup)    [] Lat. Pull downs     [] Medial epicondylitis program    [] Forward punch   [] Lateral epicondylitis program    [] Internal rotators     [] Progressive resistive exercises  [] Bench Press        [] Bench press plus  Activities:     [] Lateral pull-downs  [] Rowing     [x] Progressive two-hand supine press  [] Stepper/Exercise bike   [] Biceps: curls/supination  [] Swimming  [] Water exercises    Modalities:     Return to Sport:  [] Ultrasound     [] Plyometrics  [] Iontophoresis    [] Rhythmic stabilization  [x] Moist heat     [] Core strengthening   [] Massage     [] Sports specific program:    [x] Cryotherapy      [] Electrical stimulation     [] Paraffin  [] Whirlpool  [] TENS    [x] Home exercise program (copy to patient).    Perform exercises for:  20-30 minutes, 2-3 times/day    [x] Supervised physical therapy  Frequency: []  1x week  [x] 2x week  [] 3x week  [] Other:   Duration: [] 2 weeks   [x] 4 weeks  [] 6 weeks  [] Other:     Additional Instructions

## 2020-07-31 ENCOUNTER — HOSPITAL ENCOUNTER (OUTPATIENT)
Dept: PHYSICAL THERAPY | Age: 70
Setting detail: THERAPIES SERIES
Discharge: HOME | End: 2020-07-31
Payer: MEDICARE

## 2020-07-31 PROCEDURE — 97161 PT EVAL LOW COMPLEX 20 MIN: CPT

## 2020-07-31 PROCEDURE — 97110 THERAPEUTIC EXERCISES: CPT

## 2020-07-31 PROCEDURE — 97530 THERAPEUTIC ACTIVITIES: CPT

## 2020-07-31 NOTE — FLOWSHEET NOTE
[facility], [place]      Physical Therapy Daily Treatment Note  Date:  2020    Patient Name:  Edith Odom    :  1950  MRN: [id number]  Medical/Treatment Diagnosis Information:  · Diagnosis: M19.012 (ICD-10-CM) - Osteoarthritis of left shoulder, unspecified osteoarthritis type  · Treatment Diagnosis: M25.512 Left shoulder pain  Insurance/Certification information:  PT Insurance Information: [employer]  [address] $ 40CP  Physician Information:  Referring Practitioner: Domingo Rosario  Has the plan of care been signed (Y/N):        []  Yes  [x]  No     Date of Patient follow up with Physician:       Is this a Progress Report:     []  Yes  [x]  No          Progress report will be due (10 Rx or 30 days whichever is less):       Recertification will be due (POC Duration  / 90 days whichever is less):          Visit # Insurance Allowable Auth Required   1 MN []  Yes []  No        Functional Scale: UEFI 8% ; NDI 8%     Date assessed:  20      Latex Allergy:  [x]NO      []YES  Preferred Language for Healthcare:   [x]English       []other:    Pain level:  6/10      SUBJECTIVE:  See eval    OBJECTIVE: See eval   Observation:    Test measurements:      ROM PROM AROM  Comment    L R L R    Flexion        Abduction        ER        IR        Other        Other             Strength L R Comment   Flexion      Abduction      ER      IR      Supraspinatus      Upper Trap      Lower Trap      Mid Trap      Rhomboids      Biceps      Triceps      Horizontal Abduction      Horizontal Adduction      Lats          RESTRICTIONS/PRECAUTIONS:     Exercises/Interventions:   Exercise/Equipment Resistance/Repetitions Other comments   Aerobic Conditioning     Aerodyne          Stretching/PROM     Wand     Table Slides     Wall slides  Flex 10x10     UE [place]olin     Pulleys     Pendulum     BB IR 10x10     SL IR     Pec doorway stretch     CBA stretch 10x10    UT stretch     LS stretch     Isometrics Retraction          Weight shift     Flexion     Abduction     External Rotation     Internal Rotation     Biceps     Triceps          PRE's     Flexion     Abduction     External Rotation 3x10 SL     Internal Rotation     Shrugs     EXT     Reverse Flys     Serratus 3x10     Horizontal Abd with ER     Biceps     Triceps     Retraction          Cable Column/Theraband     External Rotation     Internal Rotation     Shrugs     Lats     Ext     Flex     Scapular Retraction 3x10 blue     BIC     TRIC     PNF          Dynamic Stability          Plyoback                Therapeutic Exercise and NMR EXR  [] (78983) Provided verbal/tactile cueing for activities related to strengthening, flexibility, endurance, ROM  for improvements in scapular, scapulothoracic and UE control with self care, reaching, carrying, lifting, house/yardwork, driving/computer work.    [] (37415) Provided verbal/tactile cueing for activities related to improving balance, coordination, kinesthetic sense, posture, motor skill, proprioception  to assist with  scapular, scapulothoracic and UE control with self care, reaching, carrying, lifting, house/yardwork, driving/computer work. Therapeutic Activities:    [] (18029 or 95009) Provided verbal/tactile cueing for activities related to improving balance, coordination, kinesthetic sense, posture, motor skill, proprioception and motor activation to allow for proper function of scapular, scapulothoracic and UE control with self care, carrying, lifting, driving/computer work.      Home Exercise Program:    [x] (47513) Reviewed/Progressed HEP activities related to strengthening, flexibility, endurance, ROM of scapular, scapulothoracic and UE control with self care, reaching, carrying, lifting, house/yardwork, driving/computer work  [] (86674) Reviewed/Progressed HEP activities related to improving balance, coordination, kinesthetic sense, posture, motor skill, proprioception of scapular, scapulothoracic and UE control with self care, reaching, carrying, lifting, house/yardwork, driving/computer work      Manual Treatments:  PROM / STM / Oscillations-Mobs:  G-I, II, III, IV (Katherine, Inf., Post.)  [] (59118) Provided manual therapy to mobilize soft tissue/joints of cervical/CT, scapular GHJ and UE for the purpose of modulating pain, promoting relaxation,  increasing ROM, reducing/eliminating soft tissue swelling/inflammation/restriction, improving soft tissue extensibility and allowing for proper ROM for normal function with self care, reaching, carrying, lifting, house/yardwork, driving/computer work    Modalities:      Charges:  Timed Code Treatment Minutes: 27   Total Treatment Minutes: 45     [x] EVAL (LOW) 45080   [] EVAL (MOD) 08878   [] EVAL (HIGH) 12431   [] RE-EVAL   [x] KH(53273) x  1   [] IONTO  [] NMR (21121) x     [] VASO  [] Manual (12550) x      [] Other:  [x] TA x  1    [] Mech Traction (92013)  [] ES(attended) (01417)      [] ES (un) (02979):       GOALS  Patient stated goal:  Reduce pain, return to yoga and walking without restriction    [] Progressing: [] Met: [] Not Met: [] Adjusted    Therapist goals for Patient:   Short Term Goals: To be achieved in: 2 weeks  1. Independent in HEP and progression per patient tolerance, in order to prevent re-injury. [] Progressing: [] Met: [] Not Met: [] Adjusted   2. Patient will have a decrease in pain to facilitate improvement in movement, function, and ADLs as indicated by Functional Deficits. [] Progressing: [] Met: [] Not Met: [] Adjusted    Long Term Goals: To be achieved in: 4-6 weeks  1. Disability index score of 4% or less for the NDI/UEFI  to assist with reaching prior level of function. [] Progressing: [] Met: [] Not Met: [] Adjusted  2. Patient will demonstrate increased AROM to 160+ flex/abd, T10 or higher behind back IR   to allow for proper joint functioning as indicated by patients Functional Deficits.     [] Progressing:

## 2020-07-31 NOTE — PLAN OF CARE
[address] [address], [address] [place], [address]  Phone: [phone]   Fax: [phone]          Physical Therapy Certification    Dear Referring Practitioner: John Fernando,    We had the pleasure of evaluating the following patient for physical therapy services at [address] [place] [address]. A summary of our findings can be found in the initial assessment below. This includes our plan of care. If you have any questions or concerns regarding these findings, please do not hesitate to contact me at the office phone number checked above. Thank you for the referral.       Physician Signature:_______________________________Date:__________________  By signing above (or electronic signature), therapists plan is approved by physician      Patient: Samara Sosa   : 1950   MRN: [id number]  Referring Physician: Referring Practitioner: John Fernando      Evaluation Date: 2020      Medical Diagnosis Information:  Diagnosis: M19.012 (ICD-10-CM) - Osteoarthritis of left shoulder, unspecified osteoarthritis type   Treatment Diagnosis: M25.512 Left shoulder pain                                           Precautions/ Contra-indications:   Latex Allergy:  [x]NO      []YES  Preferred Language for Healthcare:   [x]English       []Other:    C-SSRS Triggered by Intake questionnaire (Past 2 wk assessment):   [x] No, Questionnaire did not trigger screening.   [] Yes, Patient intake triggered further evaluation      [] C-SSRS Screening completed  [] PCP notified via Plan of Care  [] Emergency services notified     SUBJECTIVE:   Per MD note : Samara Sosa is a very pleasant 72 y.o. female who presents to our office today with complaints of left shoulder pain. She has been kindly referred by her primary care physician Dr. Elidia Woodruff.   Mrs. Pricila Hernandez describes a 2 year history of left shoulder \"discomfort\" which has gradually progressed, is aching in quality, is relatively mild in severity, and is exacerbated by physical activity while being alleviated by rest.       However, 7 to 10 days ago she experienced an atraumatic insidious onset exacerbation of left shoulder pain which prompted her to seek medical attention from Dr. Raine Javed. This subacute pain is described as moderate to severe in severity, aching and throbbing in quality, and localized from the left paraspinal region of her neck down laterally to just medial to the acromion. She describes a more dull pain that begins t around the superior aspect of the acromion and traveling down the lateral aspect of her upper arm. She furthermore describes a tingliness which travels from her left shoulder down to her fingers, although she cannot be more specific in localizing which fingers or which aspects of her forearm are experiencing this paresthesia. Today: Pt reports that she woke up in the middle of the night with pain in the left shoulder about 2 weeks ago and she tried ice/heat/tylenol and nothing helped. pt was prescribed medrol dosepak on 7/29. She notes she feels much better with some tightness in the shoulders/neck. She notes prior to covid she was doing yoga 2x per week and would like to start back in a few weeks if its possible. She likes to stay active with silver sneakers and walking as well. She notes prior to to dosepak she had pain with all ADLs but she just pushed through it. She notes that she had tingling into hand but can't pinpoint specific movements or positions it happened in, notes it only happened occasionally and has since subsided since she began dosepak.      Relevant Medical History: OA   Functional Disability Index: NDI, UEFI    Pain Scale: 0/10 on dosepak, 6/10 at worst prior to   Easing factors: dosepak,   Provocative factors: reaching, lifting, carrying      Type: []Constant   []Intermittent  []Radiating []Localized []other:     Numbness/Tingling: mild into fingers but nonspecific    Functional Limitations/Impairments: []Lifting/reaching []Grooming []Carrying    []ADL's []Driving []Sports/Recreations   []Other:    Occupation/School:     Living Status/Prior Level of Function: Independent with ADLs and IADLs, walking daily, yoga, silver sneakers classes     OBJECTIVE:     CERV ROM     Cervical Flexion 40    Cervical Extension 40    Cervical SB 30 R   30 L     Cervical rotation     Reflexes/Sensation (myotomes/dermatomes): Intact     Joint mobility:    [x]Normal    []Hypo   []Hyper    Palpation: no TTP     Functional Mobility/Transfers:     Posture: Forward head, rounded shoulders     Bandages/Dressings/Incisions:  Na     Gait: (include devices/WB status)  wfl     Orthopedic Special Tests:     ROM PROM AROM  Comment    L R L R    Flexion   145 160    Abduction   150 160    ER        IR   L3 T6-7    Other        Other             Strength L R Comment   Flexion 4 4+    Abduction 4 4+    ER 4 4+    IR 4 4+    Supraspinatus      Upper Trap      Lower Trap      Mid Trap      Rhomboids      Biceps      Triceps      Horizontal Abduction      Horizontal Adduction      Lats        Special Tests Left Right   Apley Scratch IR:  ER:   Cross body: IR:  ER:  Cross Body:   Neer's     Full Can     Empty Can     Lelon Lesches     Nerve Tension Testing     Speed's     Kaiser's      Spurling's     Repeated Scaption                                       [x] Patient history, allergies, meds reviewed. Medical chart reviewed. See intake form. Review Of Systems (ROS):  [x]Performed Review of systems (Integumentary, CardioPulmonary, Neurological) by intake and observation. Intake form has been scanned into medical record. Patient has been instructed to contact their primary care physician regarding ROS issues if not already being addressed at this time.     Co-morbidities/Complexities (which will affect course of rehabilitation):   []None           Arthritic conditions   []Rheumatoid arthritis (M05.9)  []Osteoarthritis (M19.91)   Cardiovascular conditions   []Hypertension (I10)  []Hyperlipidemia (E78.5)  []Angina pectoris (I20)  []Atherosclerosis (I70)   Musculoskeletal conditions   []Disc pathology   []Congenital spine pathologies   []Prior surgical intervention  []Osteoporosis (M81.8)  []Osteopenia (M85.8)   Endocrine conditions   []Hypothyroid (E03.9)  []Hyperthyroid Gastrointestinal conditions   []Constipation (M03.70)   Metabolic conditions   []Morbid obesity (E66.01)  []Diabetes type 1(E10.65) or 2 (E11.65)   []Neuropathy (G60.9)     Pulmonary conditions   []Asthma (J45)  []Coughing   []COPD (J44.9)   Psychological Disorders  []Anxiety (F41.9)  []Depression (F32.9)   []Other:   []Other:          Barriers to/and or personal factors that will affect rehab potential:              []Age  []Sex              []Motivation/Lack of Motivation                        []Co-Morbidities              []Cognitive Function, education/learning barriers              []Environmental, home barriers              []profession/work barriers  []past PT/medical experience  []other:       Falls Risk Assessment (30 days):   [x] Falls Risk assessed and no intervention required.   [] Falls Risk assessed and Patient requires intervention due to being higher risk   TUG score (>12s at risk):     [] Falls education provided, including       Functional Assessment:    Functional Assessment scale used: UEFI; NDI   Score: 8%; 8%      ASSESSMENT:   Functional Impairments   []Noted spinal or UE joint hypomobility   []Noted spinal or UE joint hypermobility   [x]Decreased UE functional ROM   [x]Decreased UE functional strength   []Abnormal reflexes/sensation/myotomal/dermatomal deficits   [x]Decreased RC/scapular/core strength and neuromuscular control   []other:      Functional Activity Limitations (from functional questionnaire and intake)   [x]Reduced ability to tolerate prolonged functional positions   []Reduced ability or difficulty with changes of positions or transfers between positions   [x]Reduced ability to maintain good posture and demonstrate good body mechanics with sitting, bending, and lifting   [] Reduced ability or tolerance with driving and/or computer work   [x]Reduced ability to sleep   [x]Reduced ability to perform lifting, reaching, carrying tasks   []Reduced ability to tolerate impact through UE   [x]Reduced ability to reach behind back   []Reduced ability to  or hold objects   [x]Reduced ability to throw or toss an object   []other:    Participation Restrictions   [x]Reduced participation in self care activities   [x]Reduced participation in home management activities   []Reduced participation in work activities   []Reduced participation in social activities. [x]Reduced participation in sport/recreation activities. Classification:   []Signs/symptoms consistent with post-surgical status including decreased ROM, strength and function.   []Signs/symptoms consistent with joint sprain/strain   [x]Signs/symptoms consistent with shoulder impingement   [x]Signs/symptoms consistent with shoulder/elbow/wrist tendinopathy   []Signs/symptoms consistent with Rotator cuff tear   []Signs/symptoms consistent with labral tear   [x]Signs/symptoms consistent with postural dysfunction    [x]Signs/symptoms consistent with Glenohumeral IR Deficit - <45 degrees   []Signs/symptoms consistent with facet dysfunction of cervical/thoracic spine    []Signs/symptoms consistent with pathology which may benefit from Dry needling     []other:     Prognosis/Rehab Potential:      []Excellent   [x]Good    []Fair   []Poor    Tolerance of evaluation/treatment:    []Excellent   [x]Good    []Fair   []Poor  PLAN:  Frequency/Duration: 1-2 days per week for 4-6 Weeks:  INTERVENTIONS:  [x] Therapeutic exercise including: strength training, ROM, for Upper extremity and core   [x]  NMR activation and proprioception for UE, scap and Core   [x] Manual therapy as indicated for shoulder, scapula and spine to include: Dry Needling/IASTM, STM, PROM, Gr I-IV mobilizations, manipulation. [x] Modalities as needed that may include: thermal agents, E-stim, Biofeedback, US, iontophoresis as indicated  [x] Patient education on joint protection, postural re-education, activity modification, progression of HEP. HEP instruction: The patient's home exercise program was reviewed and they were educated on appropriate frequency, duration and intensity. The enclosed activities were performed and new exercises were added to H.E. P. with detailed pictures included. (see scanned forms)    GOALS:  Patient stated goal:  Reduce pain, return to yoga and walking without restriction    [] Progressing: [] Met: [] Not Met: [] Adjusted    Therapist goals for Patient:   Short Term Goals: To be achieved in: 2 weeks  1. Independent in HEP and progression per patient tolerance, in order to prevent re-injury. [] Progressing: [] Met: [] Not Met: [] Adjusted   2. Patient will have a decrease in pain to facilitate improvement in movement, function, and ADLs as indicated by Functional Deficits. [] Progressing: [] Met: [] Not Met: [] Adjusted    Long Term Goals: To be achieved in: 4-6 weeks  1. Disability index score of 4% or less for the NDI/UEFI  to assist with reaching prior level of function. [] Progressing: [] Met: [] Not Met: [] Adjusted  2. Patient will demonstrate increased AROM to 160+ flex/abd, T10 or higher behind back IR   to allow for proper joint functioning as indicated by patients Functional Deficits. [] Progressing: [] Met: [] Not Met: [] Adjusted  3. Patient will demonstrate an increase in Strength to 4+/5 or greater  to allow for proper functional mobility as indicated by patients Functional Deficits. [] Progressing: [] Met: [] Not Met: [] Adjusted  4. Patient will return to  ADLs painfree  without increased symptoms or restriction.    [] Progressing: [] Met: [] Not Met: [] Adjusted  5. Patient will return to yoga without increased symptoms or restriction   [] Progressing: [] Met: [] Not Met: [] Adjusted      Physical Therapy Evaluation Complexity Justification  [] A history of present problem with:  [] no personal factors and/or comorbidities that impact the plan of care;  [x]1-2 personal factors and/or comorbidities that impact the plan of care  []3 personal factors and/or comorbidities that impact the plan of care  [] An examination of body systems using standardized tests and measures addressing any of the following: body structures and functions (impairments), activity limitations, and/or participation restrictions;:  [] a total of 1-2 or more elements   [x] a total of 3 or more elements   [] a total of 4 or more elements   [] A clinical presentation with:  [x] stable and/or uncomplicated characteristics   [] evolving clinical presentation with changing characteristics  [] unstable and unpredictable characteristics;   [] Clinical decision making of [] low, [] moderate, [] high complexity using standardized patient assessment instrument and/or measurable assessment of functional outcome.     [x] EVAL (LOW) 18832 (typically 20 minutes face-to-face)  [] EVAL (MOD) 32042 (typically 30 minutes face-to-face)  [] EVAL (HIGH) 69139 (typically 45 minutes face-to-face)  [] RE-EVAL 49996    Electronically signed by:      Todd Anderson, PT, DPT, Cert DN

## 2020-08-04 ENCOUNTER — OFFICE VISIT (OUTPATIENT)
Dept: ORTHOPEDIC SURGERY | Age: 70
End: 2020-08-04
Payer: MEDICARE

## 2020-08-04 VITALS — WEIGHT: 161 LBS | BODY MASS INDEX: 26.82 KG/M2 | HEIGHT: 65 IN | RESPIRATION RATE: 12 BRPM

## 2020-08-04 PROCEDURE — 99203 OFFICE O/P NEW LOW 30 MIN: CPT | Performed by: PHYSICIAN ASSISTANT

## 2020-08-04 NOTE — PROGRESS NOTES
New Patient: SPINE    Referring Provider:  Leona Vines MD    Chief Complaint   Patient presents with    Neck Pain     NP, CSP    Arm Pain     Right arm pain       HISTORY OF PRESENT ILLNESS:      · The patient is being sent at the request of Leona Vines MD in consultation as a new spine patient for neck pain and left arm pain. The patient is a 72 y.o. female whom reports symptoms for 1 week. Symptoms have improved over the last  5-6 days. Patient reports there was not a significant event to cause the symptoms. Today discomfort is report at 2 out of 10, describing it as aching. Symptoms are aggravated by: random, most activities. Patient has undergone recent treatment including, oral steroids and xray of cervical spine. Patient denies previous cervical spine surgery. · Ms. Dieudonne Mena presents today for evaluation of her ongoing neck and right arm pain. She also note having some right shoulder pain. She states her symptoms began approximately 1 week ago with no specific injury or trigger. She just assumed at that she \"over did it\". However, the patient does report having a history of neck and shoulder issues a few years ago as well. She states she did call her PCP who ordered xrays and prescribed an oral steroid along with meloxicam.  She reports finishing the medrol dose pack today and has not yet started the meloxicam.  Otherwise, she denies any recent treatment for this issue. The patient describes having pain relief within the first day of taking the oral steroid. She states since taking the oral steroid, her symptoms have continued to improve and today she notes minimal pain and achy sensations in her neck and right shoulder. She also states the medication has helped to reduce her arm pain, which she is no longer experiencing. She states when her arm was bothering her, she was having pins and needles in her arm, that would radiate down into her hands and fingers.   Today she describes her symptoms to be more aggravated by walking and sitting, along with other random ADL's. She reports standing seems to help. She has not had any history of cervical spine surgery and does not report today using any ambulatory devices or braces. Pain Assessment  Location of Pain: Neck  Location Modifiers: Right, Posterior(Shoulder/arm)  Severity of Pain: 2  Quality of Pain: Aching  Duration of Pain: Persistent  Frequency of Pain: Constant  Aggravating Factors: Other (Comment)(Most activities)  Limiting Behavior: Yes  Relieving Factors: Rest  Result of Injury: No  Work-Related Injury: No  Are there other pain locations you wish to document?: No      Associated signs and symptoms:   Neurogenic bowel or bladder symptoms:  no   Perceived weakness:  no   Difficulty walking:  no    Recent Imaging (within past one year)   Xrays: yes   MRI or CT of spine: no    Current/Past Treatment:   · Physical Therapy:  none  · Chiropractic:  none  · Injection:  none  · Medications:   NSAIDS:  yes   Muscle relaxer:  none   Steriods:  yes   Neuropathic medications:  none   Opioids:  none  · Previous surgery:  no  · Previous surgical consult:  no  · Other:  · Infection control  · Tested positive for MRSA in past 12 months:  no  · Tested positive for MSSA \"staph infection\" in past 12 months: no  · Tested positive for VRE (Vancomycin Resistant Enterococci) in past 12 months:   no  · Currently on any antibiotics for an infection: no  · Anticoagulants:  · On a blood thinner:  no   · Any history of bleeding disorder: no   · MRI Contraindication: no   · Previous Pain Management: no   · Goal for treatment: Reduce pain / improve function  · How long can you stand? No limitations     Sit? Varies     Walk? Varies      Past medical, surgical, social and family history reviewed with the patient.              Past Medical History:   Past Medical History:   Diagnosis Date    Chest pain Sept.24, 2015    Coronary angiogram by Dr. Jessica Knight revealed completely normal coronary arteries with normal LVEF and no regional wall notion abnormalities.  Encounter for screening mammogram for breast cancer October 21, 2015    Negative    Herpes zoster Sept., 2015    Chest wall    Hyperlipidemia     Hyperthyroidism     treated    Osteopenia DEXA - June, 2009    Lumbar T score  -0.6 and Hip T score -0.4    Osteopenia DEXA - July, 2012    Lumbar T score -0.7 and Hip T score -0.7    Osteopenia DEXA-May, 2018    Lumbar T score of -0.7 and hip T score of -0.4  FRAX - 8.7/0.8 %    Other screening mammogram July 5, 2011    Negative    Other screening mammogram July, 9, 2012    Negative    Other screening mammogram Aug. 27, 2013    Negative     Other screening mammogram October 2,2015    Negative    Screening mammogram for high-risk patient *November 8, 2019    Negative    Screening mammogram, encounter for *October 28, 2016    Begative    Screening mammogram, encounter for *October 30, 2017    Negative    Screening mammogram, encounter for *October 29, 2018    Benign      Past Surgical History:     Past Surgical History:   Procedure Laterality Date    CARDIAC CATHETERIZATION  Sept.24, 2015    Dr. Vargas Tobar  March, 2004 ( 2014 )    Dr. Amanda Cortez - normal.    COLONOSCOPY  Sept., 2014 ( 2024 )    Dr. Lubna Matias - mild diverticulosis    Lemond Pun  *Sept.,2019    Dr. Cady Paulson - bilaterally     Current Medications:     Current Outpatient Medications:     meloxicam (MOBIC) 7.5 MG tablet, Take 1 tablet by mouth daily To begin AFTER completion of the 6-day Medrol Dosepak, Disp: 30 tablet, Rfl: 2    simvastatin (ZOCOR) 20 MG tablet, TAKE ONE TABLET BY MOUTH ONCE NIGHTLY, Disp: 90 tablet, Rfl: 3  Allergies:  Patient has no known allergies. Social History:    reports that she quit smoking about 27 years ago. Her smoking use included cigarettes. She has a 20.00 pack-year smoking history.  She has never used smokeless tobacco. She reports current alcohol use. Family History:   Family History   Problem Relation Age of Onset    Heart Attack Father 52        , MI.    Hypertension Mother 80        , hypertension, dementia. REVIEW OF SYSTEMS: ROS - 14 point    Constitutional: No fevers, chills, night sweats, unexplained weight loss  Eye: No vision changes or diplopia  ENT: No nasal congestion, postnasal drip or sore throat. No tinnitus  Respiratory: No cough or SOB  CV: No chest pain or palpitations  GI: No nausea, abdominal pain, stool changes  : No dysuria or hematuria  Skin: No new or changing skin lesions, no rashes  MSK: No joint swelling, morning stiffness, unusual joint pain, + neck and left shoulder pain  Neurological: No headache, confusion, syncope  Psychiatric: No excessive anxiety or depression  Endocrine: No polyuria or polydipsia  Hematologic: No lymph node enlargement or excessive bleeding  Immunologic:No history of immune deficiency or immunomodulating drugs           PHYSICAL EXAM:    Vitals: Resp. rate 12, height 5' 5\" (1.651 m), weight 161 lb (73 kg), not currently breastfeeding. GENERAL EXAM:  · General Apparence: Patient is adequately groomed with no evidence of malnutrition. · Psychiatric: Orientation: The patient is oriented to time, place and person. The patient's mood and affect are appropriate   · Vascular: Examination reveals no swelling and palpation reveals no tenderness in upper or lower extremities. Good capillary refill. · The lymphatic examination of the neck, axillae and groin reveals all areas to be without enlargement or induration   Sensation is intact without deficit in the upper and lower extremities to light touch and pinprick  · Coordination of the upper and lower extremities are normal.    CERVICAL EXAMINATION:  · Inspection: Local inspection shows no step-off or bruising. Cervical alignment is normal. No instability is noted.   · Palpation and Percussion: No evidence of tenderness at the midline. Paraspinal tenderness is not present. There is no paraspinal spasm. · Range of Motion:  limited by 25% in all planes due to pain   · Strength: 5/5 bilateral upper extremities  · Special Tests:   Spurling's and Greer's are negative bilaterally. Contreras and Impingement tests are negative bilaterally. · Skin:There are no rashes, ulcerations or lesions. · Reflexes: Bilaterally triceps, biceps and brachioradialis are 2+. Clonus absent bilaterally at the feet. No pathological reflexes are noted. · Gait & station:  normal, patient ambulates without assistance and no ataxia  · Additional Examinations:  · RIGHT UPPER EXTREMITY:  Inspection/examination of the right upper extremity does not show any tenderness, deformity or injury. Range of motion is normal and pain-free. There is no gross instability. There are no rashes, ulcerations or lesions. Strength and tone are normal. No atrophy or abnormal movements are noted. · LEFT UPPER EXTREMITY: Inspection/examination of the left upper extremity does not show any tenderness, deformity or injury. Range of motion is normal and pain-free. There is no gross instability. There are no rashes, ulcerations or lesions. Strength and tone are normal. No atrophy or abnormal movements are noted. LUMBAR/SACRAL EXAMINATION:  · Inspection: Local inspection shows no step-off or bruising. Lumbar alignment is normal. No instability is noted. · Palpation:   No evidence of tenderness at the midline. Lumbar paraspinal tenderness No tenderness to palpation of the lumbar paraspinals  Bursal tenderness No tenderness bilaterally  There is no paraspinal spasm. · Range of Motion: pain-free ROM  · Strength:   Strength testing is 5/5 in all muscle groups tested. · Special Tests:   Straight leg raise and crossed SLR negative. Taiwo's testing is negative bilaterally. FADIR's testing is negative bilaterally. Slump test negative.  Bowstring test negative  · Skin: There are no rashes, ulcerations or lesions. · Reflexes: Reflexes are symmetrically 2+ at the patellar and ankle tendons. Clonus absent bilaterally at the feet. · Gait & station: normal, patient ambulates without assistance and no ataxia  · Additional Examinations:  · RIGHT LOWER EXTREMITY: Inspection/examination of the right lower extremity does not show any tenderness, deformity or injury. Range of motion is unremarkable. There is no gross instability. There are no rashes, ulcerations or lesions. Strength and tone are normal. No atrophy or abnormal movements are noted. · LEFT LOWER EXTREMITY:  Inspection/examination of the left lower extremity does not show any tenderness, deformity or injury. Range of motion is unremarkable. There is no gross instability. There are no rashes, ulcerations or lesions. Strength and tone are normal. No atrophy or abnormal movements are noted. Diagnostic Testing:    Xrays:   I personally reviewed images of the cervical spine from 7/28/20:   FINDINGS:    Reversal of curvature at the C5-6 interspace. C5-6 degenerative disc space narrowing    C5 degenerative retrolisthesis on C6.         C4 degenerative anterolisthesis on C5. Minimal degenerative anterolisthesis of C3 on C4.         Diffuse severe facet arthropathy         C6-C7 degenerative disc space narrowing.         No evidence of acute fracture. Scoliosis with convexity to the right. Severe uncovertebral joint arthropathy in the concavity of scoliosis at the C3-C4 level with severe overhanging facet spurring, leftward.         Soft tissues: Normal         C1-C2: Lateral masses are normal              Impression    1.  Severe spondylosis      MRI or CT:  None  EMG:  None  Results for orders placed or performed in visit on 10/10/19   CK   Result Value Ref Range    Total  U/L   TSH without Reflex   Result Value Ref Range    TSH 2.050 uIU/mL   Lipid Panel   Result Value Ref Range    Cholesterol, Total 177 mg/dL    HDL 77 and to take with food. 2. PT:  I will start the patient on a trial of PT to work on a cervical stabilization program to focus on stretching, strengthening, traction and modalities as indicated. 3. Further studies: No further studies. 4. Interventional:  At this point, no interventional options are recommended. 5. Healthy Lifestyle Measures:  Patient education material reviewing the following was distributed to Becca  Anatomic drawings  Healthy lifestyle education  Osteoporosis prevention,   Back and neck pain educational information   Advanced imaging preparedness    Posture education   Proper lifting and carrying techniques,   Weight management  Quitting smoking and   Minor ways to treat back pain  For further information regarding the spine conditions and to review interventional treatments the patient was directed to [employer].    6.  Follow up:  4-6 weeks    Becca was instructed to call the office if her symptoms worsen or if new symptoms appear prior to the next scheduled visit. She is specifically instructed to contact the office between now & her scheduled appointment if she has concerns related to her condition or if she needs assistance in scheduling the above tests. She is welcome to call for an appointment sooner if she has any additional concerns or questions. Peyton OCHOA, benito scribing for YamsaferWILMER Ryan.  08/04/20 10:45 AM Peyton Blanco. The physical examination was performed between the patient and YamsaferWILMER Ryan. All counseling during the appointment was performed between the patient and the provider. Tate Katz PA-C, personally performed the services described in this documentation as scribed by Peyton Marquez ATC in my presence and it is both accurate and complete.               OPHELIA Wakefield PA-C  Board Certified by the [employer] on Certification of Physician [address]  Partner of [facility] ([facility])         This dictation was performed with a verbal recognition program [facility]) and it was checked for errors. It is possible that there are still dictated errors within this office note. If so, please bring any errors to my attention for an addendum. All efforts were made to ensure that this office note is accurate.

## 2020-08-05 ENCOUNTER — HOSPITAL ENCOUNTER (OUTPATIENT)
Dept: PHYSICAL THERAPY | Age: 70
Setting detail: THERAPIES SERIES
Discharge: HOME | End: 2020-08-05
Payer: MEDICARE

## 2020-08-05 PROCEDURE — 97530 THERAPEUTIC ACTIVITIES: CPT

## 2020-08-05 PROCEDURE — 97140 MANUAL THERAPY 1/> REGIONS: CPT

## 2020-08-05 PROCEDURE — 97110 THERAPEUTIC EXERCISES: CPT

## 2020-08-05 NOTE — FLOWSHEET NOTE
[facility], [place]      Physical Therapy Daily Treatment Note  Date:  2020    Patient Name:  Archana Vieira    :  1950  MRN: [id number]  Medical/Treatment Diagnosis Information:  · Diagnosis: M19.012 (ICD-10-CM) - Osteoarthritis of left shoulder, unspecified osteoarthritis type  · Treatment Diagnosis: M25.512 Left shoulder pain  Insurance/Certification information:  PT Insurance Information: Lyn[facility] $ 40CP  Physician Information:  Referring Practitioner: [facility]  Has the plan of care been signed (Y/N):        []  Yes  [x]  No     Date of Patient follow up with Physician:       Is this a Progress Report:     []  Yes  [x]  No          Progress report will be due (10 Rx or 30 days whichever is less):       Recertification will be due (POC Duration  / 90 days whichever is less):          Visit # Insurance Allowable Auth Required   2 MN []  Yes []  No        Functional Scale: UEFI 8% ; NDI 8%     Date assessed:  20      Latex Allergy:  [x]NO      []YES  Preferred Language for Healthcare:   [x]English       []other:    Pain level:  3/10      SUBJECTIVE:   Pt has been compliant with HEP. She finished dosepak and is now taking meloxicam. She is more aware of shoulder today. She has been trying to fix her posture when going on walks.  She saw PA for Dr. Kaylee Martinez yesterday who suggested PT for now with the neck but she doesn't;t feel like she has pain in the neck     OBJECTIVE: 20   Observation:    Test measurements:       CERV ROM       Cervical Flexion 40     Cervical Extension 40     Cervical SB 30 R   30 L      Cervical rotation        ROM PROM AROM  Comment     L R L R     Flexion     145 160     Abduction     150 160     ER             IR     L3 T6-7     Other             Other                    Strength L R Comment   Flexion 4 4+     Abduction 4 4+     ER 4 4+     IR 4 4+     Supraspinatus         Upper Trap         Lower Trap         Mid Trap       Rhomboids         Biceps         Triceps         Horizontal Abduction         Horizontal Adduction         Lats                  RESTRICTIONS/PRECAUTIONS:     Exercises/Interventions:   Exercise/Equipment Resistance/Repetitions Other comments   Aerobic Conditioning     Aerodyne          Stretching/PROM     Wand     Table Slides     Wall slides  Flex 10x10     UE [place]     Pulleys     Pendulum     BB IR 10x10     SL IR 10x10 at wall     Pec doorway stretch        UT stretch 10x10     LS stretch 10x10    Isometrics     Retraction          Weight shift     Flexion     Abduction     External Rotation     Internal Rotation     Biceps     Triceps     Chin Tucks 10x10 supine     PRE's     Flexion     Abduction     External Rotation 3x10 SL     Internal Rotation     Shrugs     EXT     Reverse Flys     Serratus 3x10     Horizontal Abd with ER     Biceps     Triceps     Retraction          Cable Column/Theraband     External Rotation     Internal Rotation     Shrugs     Lats     Ext     Flex     Scapular Retraction 3x10 blue     BIC     TRIC     PNF          Dynamic Stability          [employer]            Access Code: [email or web address]   URL: [email or web address]   Date: 08/05/2020   Prepared by: Casey Custer     Exercises  Standing Single Shoulder Flexion Wall Slide with Palm Up - 10 reps - 10 hold - 1x daily - 7x weekly  Standing Shoulder Internal Rotation Stretch with Towel - 10 reps - 10 hold - 1x daily - 7x weekly  Standing Sleeper Stretch at [address] - 10 reps - 10 hold - 1x daily - 7x weekly  Seated Upper Trapezius Stretch - 10 reps - 10 hold - 1x daily - 7x weekly  Sidelying Shoulder External Rotation - 10 reps - 2 sets - 1x daily - 7x weekly  Gentle Levator Scapulae Stretch - 10 reps - 10 hold - 1x daily - 7x weekly  Supine Chin Tuck - 10 reps - 3 sets - 1x daily - 7x weekly  Supine Scapular Protraction in Flexion with Dumbbells - 10 reps - 2 sets - 1x daily - 7x weekly  Scapular Retraction with Resistance - 10 Adjusted    Overall Progression Towards Functional goals/ Treatment Progress Update:  [] Patient is progressing as expected towards functional goals listed. [] Progression is slowed due to complexities/Impairments listed. [] Progression has been slowed due to co-morbidities. [x] Plan just implemented, too soon to assess goals progression <30days   [] Goals require adjustment due to lack of progress  [] Patient is not progressing as expected and requires additional follow up with physician  [] Other    Prognosis for POC: [x] Good [] Fair  [] Poor      Patient requires continued skilled intervention: [x] Yes  [] No    Treatment/Activity Tolerance:  [x] Patient able to complete treatment  [] Patient limited by fatigue  [] Patient limited by pain     [] Patient limited by other medical complications  [] Other: Pt notes that cervical traction helps tremendously to alleviate tightness in neck. Added LS and UT stretch as well as chin tucks today. Replaced CBA stretch with IR sleeper at wall d/t strain on neck with rolling on table, pt notes better stretch. Continue to progress as tolerated. Return to Play: (if applicable)   []  Stage 1: Intro to Strength   []  Stage 2: Return to Run and Strength   []  Stage 3: Return to Jump and Strength   []  Stage 4: Dynamic Strength and Agility   []  Stage 5: Sport Specific Training     []  Ready to Return to Play, Meets All Above Stages   []  Not Ready for Return to Sports   Comments:                               PLAN: See eval  [] Continue per plan of care [] Alter current plan (see comments above)  [x] Plan of care initiated [] Hold pending MD visit [] Discharge  Note: If patient does not return for scheduled/ recommended follow up visits, this note will serve as a discharge from care along with most recent update on progress.     Reviewed insurance benefits for physical therapy in an outpatient hospital based setting with the patient, including deductible  and allowable

## 2020-08-11 ENCOUNTER — HOSPITAL ENCOUNTER (OUTPATIENT)
Dept: PHYSICAL THERAPY | Age: 70
Setting detail: THERAPIES SERIES
Discharge: HOME | End: 2020-08-11
Payer: MEDICARE

## 2020-08-11 PROCEDURE — 97530 THERAPEUTIC ACTIVITIES: CPT

## 2020-08-11 PROCEDURE — 97140 MANUAL THERAPY 1/> REGIONS: CPT

## 2020-08-11 PROCEDURE — 97110 THERAPEUTIC EXERCISES: CPT

## 2020-08-11 NOTE — FLOWSHEET NOTE
[facility], [place]      Physical Therapy Daily Treatment Note  Date:  2020    Patient Name:  Edith Odom    :  1950  MRN: [id number]  Medical/Treatment Diagnosis Information:  · Diagnosis: M19.012 (ICD-10-CM) - Osteoarthritis of left shoulder, unspecified osteoarthritis type  · Treatment Diagnosis: M25.512 Left shoulder pain  Insurance/Certification information:  PT Insurance Information: [employer] [facility]  [address] $ 40CP  Physician Information:  Referring Practitioner: Domingo Rosario  Has the plan of care been signed (Y/N):        []  Yes  [x]  No     Date of Patient follow up with Physician:       Is this a Progress Report:     []  Yes  [x]  No          Progress report will be due (10 Rx or 30 days whichever is less):       Recertification will be due (POC Duration  / 90 days whichever is less):          Visit # Insurance Allowable Auth Required   3 MN []  Yes []  No        Functional Scale: UEFI 8% ; NDI 8%     Date assessed:  20      Latex Allergy:  [x]NO      []YES  Preferred Language for Healthcare:   [x]English       []other:    Pain level:  3/10      SUBJECTIVE:  Pt notes that she felt some discomfort into the neck yesterday but today feels better. She did have some confusion with IR sleeper and upper trap stretch, not sure if shes doing correctly. Overall she feels like shoulder is improving. She feels like she has tightness in the upper trap like she is guarding at her shoulders.      OBJECTIVE: 20   Observation:    Test measurements:       CERV ROM       Cervical Flexion 40     Cervical Extension 40     Cervical SB 30 R   30 L      Cervical rotation        ROM PROM AROM  Comment     L R L R     Flexion     145 160     Abduction     150 160     ER             IR     L3 T6-7     Other             Other                    Strength L R Comment   Flexion 4 4+     Abduction 4 4+     ER 4 4+     IR 4 4+     Supraspinatus         Upper Trap         Lower Trap         Mid Trap         Rhomboids         Biceps         Triceps         Horizontal Abduction         Horizontal Adduction         Lats                  RESTRICTIONS/PRECAUTIONS:     Exercises/Interventions:   Exercise/Equipment Resistance/Repetitions Other comments   Aerobic Conditioning     Aerodyne          Stretching/PROM     Wand     Table Slides     Wall slides  Flex 10x10     UE [place]     Pulleys     Pendulum     BB IR 10x10     SL IR 10x10 at wall  Reviewed form    Pec doorway stretch        UT stretch 10x10 with slight OP with arm     LS stretch 10x10 with slight OP with arm     Isometrics     Retraction          Weight shift     Flexion     Abduction     External Rotation     Internal Rotation     Biceps     Triceps     Chin Tucks 10x10 supine     PRE's     Flexion     Abduction     External Rotation 3x10 SL     Internal Rotation     Shrugs 3x10    EXT     Reverse Flys     Serratus 3x10     Horizontal Abd with ER     Biceps     Triceps     Retraction          Cable Column/Theraband     External Rotation     Internal Rotation     Shrugs     Lats     Ext     Flex     Scapular Retraction 3x10 blue     BIC     TRIC     PNF          Dynamic Stability          [employer]            Access Code: [email or web address]   URL: [email or web address]   Date: 08/05/2020   Prepared by: Michael Levers     Exercises  Standing Single Shoulder Flexion Wall Slide with Palm Up - 10 reps - 10 hold - 1x daily - 7x weekly  Standing Shoulder Internal Rotation Stretch with Towel - 10 reps - 10 hold - 1x daily - 7x weekly  Standing Sleeper Stretch at [address] - 10 reps - 10 hold - 1x daily - 7x weekly  Seated Upper Trapezius Stretch - 10 reps - 10 hold - 1x daily - 7x weekly  Sidelying Shoulder External Rotation - 10 reps - 2 sets - 1x daily - 7x weekly  Gentle Levator Scapulae Stretch - 10 reps - 10 hold - 1x daily - 7x weekly  Supine Chin Tuck - 10 reps - 3 sets - 1x daily - 7x weekly  Supine Scapular Protraction in Flexion with Dumbbells - 10 tissue extensibility and allowing for proper ROM for normal function with self care, reaching, carrying, lifting, house/yardwork, driving/computer work      Cervical traction manual with side bends : 8'       Modalities:      Charges:  Timed Code Treatment Minutes: 45   Total Treatment Minutes: 45     [] EVAL (LOW) 35711   [] EVAL (MOD) 20903   [] EVAL (HIGH) 64746   [] RE-EVAL   [x] YD(15116) x  1   [] IONTO  [] NMR (71884) x     [] VASO  [x] Manual (91591) x 1      [] Other:  [x] TA x  1    [] Mech Traction (34657)  [] ES(attended) (07029)      [] ES (un) (11679):       GOALS  Patient stated goal:  Reduce pain, return to yoga and walking without restriction    [] Progressing: [] Met: [] Not Met: [] Adjusted    Therapist goals for Patient:   Short Term Goals: To be achieved in: 2 weeks  1. Independent in HEP and progression per patient tolerance, in order to prevent re-injury. [] Progressing: [] Met: [] Not Met: [] Adjusted   2. Patient will have a decrease in pain to facilitate improvement in movement, function, and ADLs as indicated by Functional Deficits. [] Progressing: [] Met: [] Not Met: [] Adjusted    Long Term Goals: To be achieved in: 4-6 weeks  1. Disability index score of 4% or less for the NDI/UEFI  to assist with reaching prior level of function. [] Progressing: [] Met: [] Not Met: [] Adjusted  2. Patient will demonstrate increased AROM to 160+ flex/abd, T10 or higher behind back IR   to allow for proper joint functioning as indicated by patients Functional Deficits. [] Progressing: [] Met: [] Not Met: [] Adjusted  3. Patient will demonstrate an increase in Strength to 4+/5 or greater  to allow for proper functional mobility as indicated by patients Functional Deficits. [] Progressing: [] Met: [] Not Met: [] Adjusted  4. Patient will return to  ADLs painfree  without increased symptoms or restriction. [] Progressing: [] Met: [] Not Met: [] Adjusted  5.   Patient will return to yoga without increased symptoms or restriction   [] Progressing: [] Met: [] Not Met: [] Adjusted    Overall Progression Towards Functional goals/ Treatment Progress Update:  [] Patient is progressing as expected towards functional goals listed. [] Progression is slowed due to complexities/Impairments listed. [] Progression has been slowed due to co-morbidities. [x] Plan just implemented, too soon to assess goals progression <30days   [] Goals require adjustment due to lack of progress  [] Patient is not progressing as expected and requires additional follow up with physician  [] Other    Prognosis for POC: [x] Good [] Fair  [] Poor      Patient requires continued skilled intervention: [x] Yes  [] No    Treatment/Activity Tolerance:  [x] Patient able to complete treatment  [] Patient limited by fatigue  [] Patient limited by pain     [] Patient limited by other medical complications  [] Other:  Reviewed pt HEP for form, corrected several exercises. So will not progress at this time. She does respond well to manual cervical traction. Continue to progress as tolerated. Return to Play: (if applicable)   []  Stage 1: Intro to Strength   []  Stage 2: Return to Run and Strength   []  Stage 3: Return to Jump and Strength   []  Stage 4: Dynamic Strength and Agility   []  Stage 5: Sport Specific Training     []  Ready to Return to Play, Meets All Above Stages   []  Not Ready for Return to Sports   Comments:                               PLAN: See eval  [] Continue per plan of care [] Alter current plan (see comments above)  [x] Plan of care initiated [] Hold pending MD visit [] Discharge  Note: If patient does not return for scheduled/ recommended follow up visits, this note will serve as a discharge from care along with most recent update on progress. Reviewed insurance benefits for physical therapy in an outpatient hospital based setting with the patient, including deductible  and allowable visit number.  Pt was

## 2020-08-18 ENCOUNTER — HOSPITAL ENCOUNTER (OUTPATIENT)
Dept: PHYSICAL THERAPY | Age: 70
Setting detail: THERAPIES SERIES
Discharge: HOME | End: 2020-08-18
Payer: MEDICARE

## 2020-08-18 PROCEDURE — 97530 THERAPEUTIC ACTIVITIES: CPT

## 2020-08-18 PROCEDURE — 97110 THERAPEUTIC EXERCISES: CPT

## 2020-08-18 NOTE — PLAN OF CARE
Retraction          Weight shift     Flexion     Abduction     External Rotation     Internal Rotation     Biceps     Triceps     Chin Tucks 10x10 supine     PRE's     Flexion     Abduction     External Rotation 3x10 red  Modified with band d/t pain in sidelying anterior shoulder    Internal Rotation     Shrugs 3x10    EXT     Reverse Flys     Serratus 3x10     Horizontal Abd with ER     Biceps     Triceps     Retraction          Cable Column/Theraband     External Rotation     Internal Rotation     Shrugs     Lats     Ext     Flex     Scapular Retraction 3x10 blue     BIC     TRIC     PNF          Dynamic Stability          [employer]            Access Code: [email or web address]   URL: [email or web address]   Date: 08/18/2020   Prepared by: Citlalli Yap     Exercises  Standing Single Shoulder Flexion Wall Slide with Palm Up - 10 reps - 10 hold - 1x daily - 7x weekly  Standing Shoulder Internal Rotation Stretch with Towel - 10 reps - 10 hold - 1x daily - 7x weekly  Standing Sleeper Stretch at [address] [place] [address] 10 reps - 10 hold - 1x daily - 7x weekly  Seated Upper Trapezius Stretch - 10 reps - 10 hold - 1x daily - 7x weekly  Gentle Levator Scapulae Stretch - 10 reps - 10 hold - 1x daily - 7x weekly  Supine Chin Tuck - 10 reps - 3 sets - 1x daily - 7x weekly  Supine Scapular Protraction in Flexion with Dumbbells - 10 reps - 2 sets - 1x daily - 7x weekly  Shoulder External Rotation with Anchored Resistance - 10 reps - 3 sets - 1x daily - 7x weekly  Scapular Retraction with Resistance - 10 reps - 2 sets - 1x daily - 7x weekly    Therapeutic Exercise and NMR EXR  [] (00955) Provided verbal/tactile cueing for activities related to strengthening, flexibility, endurance, ROM  for improvements in scapular, scapulothoracic and UE control with self care, reaching, carrying, lifting, house/yardwork, driving/computer work.    [] (15318) Provided verbal/tactile cueing for activities related to improving balance, coordination, kinesthetic sense, posture, motor skill, proprioception  to assist with  scapular, scapulothoracic and UE control with self care, reaching, carrying, lifting, house/yardwork, driving/computer work. Therapeutic Activities:    [] (07848 or 20267) Provided verbal/tactile cueing for activities related to improving balance, coordination, kinesthetic sense, posture, motor skill, proprioception and motor activation to allow for proper function of scapular, scapulothoracic and UE control with self care, carrying, lifting, driving/computer work.      Home Exercise Program:    [x] (55828) Reviewed/Progressed HEP activities related to strengthening, flexibility, endurance, ROM of scapular, scapulothoracic and UE control with self care, reaching, carrying, lifting, house/yardwork, driving/computer work  [] (52136) Reviewed/Progressed HEP activities related to improving balance, coordination, kinesthetic sense, posture, motor skill, proprioception of scapular, scapulothoracic and UE control with self care, reaching, carrying, lifting, house/yardwork, driving/computer work      Manual Treatments:  PROM / STM / Oscillations-Mobs:  G-I, II, III, IV (PA's, Inf., Post.)  [] (02206) Provided manual therapy to mobilize soft tissue/joints of cervical/CT, scapular GHJ and UE for the purpose of modulating pain, promoting relaxation,  increasing ROM, reducing/eliminating soft tissue swelling/inflammation/restriction, improving soft tissue extensibility and allowing for proper ROM for normal function with self care, reaching, carrying, lifting, house/yardwork, driving/computer work            Modalities:      Charges:  Timed Code Treatment Minutes: 38   Total Treatment Minutes: 38     [] EVAL (LOW) 82909   [] EVAL (MOD) 29648   [] EVAL (HIGH) 79849   [] RE-EVAL   [x] JV(04166) x  1   [] IONTO  [] NMR (49234) x     [] VASO  [] Manual (19622) x       [] Other:  [x] TA x  2   [] Mech Traction (61223)  [] ES(attended) (52751)      [] ES (un) (74151):       GOALS  Patient stated goal:  Reduce pain, return to yoga and walking without restriction    [] Progressing: [] Met: [] Not Met: [] Adjusted    Therapist goals for Patient:   Short Term Goals: To be achieved in: 2 weeks  1. Independent in HEP and progression per patient tolerance, in order to prevent re-injury. [] Progressing: [x] Met: [] Not Met: [] Adjusted   2. Patient will have a decrease in pain to facilitate improvement in movement, function, and ADLs as indicated by Functional Deficits. [] Progressing: [x] Met: [] Not Met: [] Adjusted    Long Term Goals: To be achieved in: 4-6 weeks  1. Disability index score of 4% or less for the NDI/UEFI  to assist with reaching prior level of function. [] Progressing: [] Met: [] Not Met: [] Adjusted  2. Patient will demonstrate increased AROM to 160+ flex/abd, T10 or higher behind back IR   to allow for proper joint functioning as indicated by patients Functional Deficits. [] Progressing: [x] Partially Met: flex not met  [] Not Met: [] Adjusted  3. Patient will demonstrate an increase in Strength to 4+/5 or greater  to allow for proper functional mobility as indicated by patients Functional Deficits. [] Progressing: [x] Met: [] Not Met: [] Adjusted  4. Patient will return to  ADLs painfree  without increased symptoms or restriction. [] Progressing: [x] Met: [] Not Met: [] Adjusted  5. Patient will return to yoga without increased symptoms or restriction   [x] Progressing: [] Met: [] Not Met: [] Adjusted    Overall Progression Towards Functional goals/ Treatment Progress Update:  [] Patient is progressing as expected towards functional goals listed. [] Progression is slowed due to complexities/Impairments listed. [] Progression has been slowed due to co-morbidities.   [x] Plan just implemented, too soon to assess goals progression <30days   [] Goals require adjustment due to lack of progress  [] Patient is not progressing as expected and requires additional follow up with physician  [] Other    Prognosis for POC: [x] Good [] Fair  [] Poor      Patient requires continued skilled intervention: [x] Yes  [] No    Treatment/Activity Tolerance:  [x] Patient able to complete treatment  [] Patient limited by fatigue  [] Patient limited by pain     [] Patient limited by other medical complications  [] Other:  Pt shows improvements in ROM and strength of L gh joint. She also shows improvements in motion of cspine. 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 She is independent with HEP. If pt does not return, this will serve as DC summary. Return to Play: (if applicable)   []  Stage 1: Intro to Strength   []  Stage 2: Return to Run and Strength   []  Stage 3: Return to Jump and Strength   []  Stage 4: Dynamic Strength and Agility   []  Stage 5: Sport Specific Training     []  Ready to Return to Play, Meets All Above Stages   []  Not Ready for Return to Sports   Comments:                               PLAN: See eval  [] Continue per plan of care [] Alter current plan (see comments above)  [x] Plan of care initiated [] Hold pending MD visit [] Discharge  Note: If patient does not return for scheduled/ recommended follow up visits, this note will serve as a discharge from care along with most recent update on progress. Reviewed insurance benefits for physical therapy in an outpatient hospital based setting with the patient, including deductible  and allowable visit number.  Pt was informed of possible out of pocket costs, as well as, informed of other service options for continuing supervised sessions without required skilled PT intervention such as the [facility] program.      Electronically signed by:  Todd Anderson, PT, DPT, Cert DN

## 2020-08-25 ENCOUNTER — OFFICE VISIT (OUTPATIENT)
Dept: ORTHOPEDIC SURGERY | Age: 70
End: 2020-08-25
Payer: MEDICARE

## 2020-08-25 VITALS — HEIGHT: 65 IN | WEIGHT: 161 LBS | BODY MASS INDEX: 26.82 KG/M2

## 2020-08-25 PROCEDURE — 99213 OFFICE O/P EST LOW 20 MIN: CPT | Performed by: ORTHOPAEDIC SURGERY

## 2020-08-25 NOTE — PROGRESS NOTES
Abduction 170, Internal Rotation T11 vs T9 on the right    Passive Range of Motion: Deferred    Strength:  External Rotation 4/5, Internal Rotation 4/5    Special Tests:  Mild pain with cross arm adduction. No Antelmo muscle deformity. Neurovascular: Sensation to light touch is intact, no motor deficits, palpable radial pulses 2+        Radiology:     No new XR obtained at this time. Assessment :  Ms. Rico Vogel is a pleasant, 72 y.o. patient with mild-moderate glenohumeral osteoarthritis of the left shoulder. Impression:  Encounter Diagnosis   Name Primary?  Osteoarthritis of left shoulder, unspecified osteoarthritis type Yes       Office Procedures:  Orders Placed This Encounter   Procedures    [facility] - [facility]     Referral Priority:   Routine     Referral Type:   Eval and Treat     Referral Reason:   Specialty Services Required     Requested Specialty:   Physical Therapy     Number of Visits Requested:   1       Treatment Plan: Rico Vogel has responded well to conservative treatment. Weaning off the Meloxicam is appropriate. We recommend that she continue in physical therapy at the [facility] road office - she may begin to transition to a home-based program. We will see Guru Sanabria back on an as needed basis. All questions were answered to patient's satisfaction and She was encouraged to call with any further questions or concerns. Rico Vogel is in agreement with this plan. 8/25/2020  10:19 AM    Luba Ricardo ATC  [employer] [address]    During this examination, IClaudia, functioned as a scribe for Dr. Johnna Lai. The history taking and physical examination were performed by Dr. Beth Ortiz. All counseling during the appointment was performed between the patient and Dr. Beth Ortiz.  8/25/20   _________________  I, Dr. Johnna Lai, personally performed the services described in this documentation as described by Yovani Han ATC in my presence, and it is both accurate and complete. Fernanda Avitia MD, PhD  8/25/2020

## 2020-08-27 ENCOUNTER — OFFICE VISIT (OUTPATIENT)
Dept: INTERNAL MEDICINE CLINIC | Age: 70
End: 2020-08-27
Payer: MEDICARE

## 2020-08-27 VITALS
TEMPERATURE: 99.3 F | HEART RATE: 88 BPM | SYSTOLIC BLOOD PRESSURE: 118 MMHG | DIASTOLIC BLOOD PRESSURE: 76 MMHG | RESPIRATION RATE: 12 BRPM | BODY MASS INDEX: 26.86 KG/M2 | HEIGHT: 65 IN | WEIGHT: 161.2 LBS

## 2020-08-27 PROCEDURE — G0439 PPPS, SUBSEQ VISIT: HCPCS | Performed by: INTERNAL MEDICINE

## 2020-08-27 PROCEDURE — 93000 ELECTROCARDIOGRAM COMPLETE: CPT | Performed by: INTERNAL MEDICINE

## 2020-08-27 ASSESSMENT — LIFESTYLE VARIABLES
HOW OFTEN DURING THE LAST YEAR HAVE YOU FAILED TO DO WHAT WAS NORMALLY EXPECTED FROM YOU BECAUSE OF DRINKING: 0
HOW MANY STANDARD DRINKS CONTAINING ALCOHOL DO YOU HAVE ON A TYPICAL DAY: 0
HAS A RELATIVE, FRIEND, DOCTOR, OR ANOTHER HEALTH PROFESSIONAL EXPRESSED CONCERN ABOUT YOUR DRINKING OR SUGGESTED YOU CUT DOWN: 0
AUDIT TOTAL SCORE: 3
HOW OFTEN DO YOU HAVE SIX OR MORE DRINKS ON ONE OCCASION: 0
HAVE YOU OR SOMEONE ELSE BEEN INJURED AS A RESULT OF YOUR DRINKING: 0
HOW OFTEN DURING THE LAST YEAR HAVE YOU NEEDED AN ALCOHOLIC DRINK FIRST THING IN THE MORNING TO GET YOURSELF GOING AFTER A NIGHT OF HEAVY DRINKING: 0
HOW OFTEN DURING THE LAST YEAR HAVE YOU HAD A FEELING OF GUILT OR REMORSE AFTER DRINKING: 0
HOW OFTEN DURING THE LAST YEAR HAVE YOU BEEN UNABLE TO REMEMBER WHAT HAPPENED THE NIGHT BEFORE BECAUSE YOU HAD BEEN DRINKING: 0
HOW OFTEN DO YOU HAVE A DRINK CONTAINING ALCOHOL: 3
AUDIT-C TOTAL SCORE: 3
HOW OFTEN DURING THE LAST YEAR HAVE YOU FOUND THAT YOU WERE NOT ABLE TO STOP DRINKING ONCE YOU HAD STARTED: 0

## 2020-08-27 ASSESSMENT — PATIENT HEALTH QUESTIONNAIRE - PHQ9
SUM OF ALL RESPONSES TO PHQ QUESTIONS 1-9: 0
SUM OF ALL RESPONSES TO PHQ QUESTIONS 1-9: 0

## 2020-08-27 NOTE — PROGRESS NOTES
Laya Haw 72 y.o. presents today with   Chief Complaint   Patient presents with    Medicare AWV       Family History   Problem Relation Age of Onset    Heart Attack Father 52        , MI.    Hypertension Mother 80        , hypertension, dementia. Social History     Socioeconomic History    Marital status:      Spouse name: Milli Torres Number of children: 10    Years of education: Not on file    Highest education level: Not on file   Occupational History    Occupation: works at [employer] strain: Not on file    Food insecurity     Worry: Not on file     Inability: Not on file   [employer] needs     Medical: Not on file     Non-medical: Not on file   Tobacco Use    Smoking status: Former Smoker     Packs/day: 1.00     Years: 20.00     Pack years: 20.00     Types: Cigarettes     Last attempt to quit: 1993     Years since quittin.6    Smokeless tobacco: Never Used   Substance and Sexual Activity    Alcohol use: Yes     Alcohol/week: 0.0 standard drinks     Comment: occasional    Drug use: Not on file    Sexual activity: Not on file   Lifestyle    Physical activity     Days per week: Not on file     Minutes per session: Not on file    Stress: Not on file   Relationships    Social connections     Talks on phone: Not on file     Gets together: Not on file     Attends Judaism service: Not on file     Active member of club or organization: Not on file     Attends meetings of clubs or organizations: Not on file     Relationship status: Not on file    Intimate partner violence     Fear of current or ex partner: Not on file     Emotionally abused: Not on file     Physically abused: Not on file     Forced sexual activity: Not on file   Other Topics Concern    Not on file   Social History Narrative    Living Will:  Yes.         Caffeine:        SODA - 0          TEA - 0        COFFEE - 1-2 cups a day       Patient Active Problem List   Diagnosis    Hyperlipidemia       Past Medical History:   Diagnosis Date    Chest pain Sept.24, 2015    Coronary angiogram by Dr. Siria Neville revealed completely normal coronary arteries with normal LVEF and no regional wall notion abnormalities.  Encounter for screening mammogram for breast cancer 10/21/2015    Negative    Herpes zoster Sept., 2015    Chest wall    Hyperlipidemia     Hyperthyroidism     treated    Obstructive sleep apnea 2019    Dr. Travis Person Osteopenia DEXA - June, 2009    Lumbar T score  -0.6 and Hip T score -0.4    Osteopenia DEXA - July, 2012    Lumbar T score -0.7 and Hip T score -0.7    Osteopenia DEXA-May, 2018    Lumbar T score of -0.7 and hip T score of -0.4  FRAX - 8.7/0.8 %    Other screening mammogram July 5, 2011    Negative    Other screening mammogram July, 9, 2012    Negative    Other screening mammogram Aug. 27, 2013    Negative     Other screening mammogram October 2,2015    Negative    Screening mammogram for high-risk patient *November 8, 2019    Negative    Screening mammogram, encounter for *October 28, 2016    Begative    Screening mammogram, encounter for *October 30, 2017    Negative    Screening mammogram, encounter for *October 29, 2018    Benign        Past Surgical History:   Procedure Laterality Date    CARDIAC CATHETERIZATION  Sept.24, 2015    Dr. Rhys Bell  March, 2004 ( 2014 )    Dr. Kandy Guerrero - normal.    COLONOSCOPY  Sept., 2014 ( 2024 )    Dr. Trini Bryant - mild diverticulosis    Tiffanie Factor  *Sept.,2019    Dr. Santiago Smoker - bilaterally       Current Outpatient Medications   Medication Sig Dispense Refill    simvastatin (ZOCOR) 20 MG tablet TAKE ONE TABLET BY MOUTH ONCE NIGHTLY 90 tablet 3     No current facility-administered medications for this visit.         No Known Allergies    Review of Systems:     Immunization History   Administered Date(s) Administered    Hepatitis A Adult (Havrix, non-tender; bowel sounds normal; no masses,  no organomegaly  Extremities: extremities normal, atraumatic, no cyanosis or edema  Neurological: Gait normal. Reflexes normal and symmetric. Sensation grossly normal  Pulse: radial=4/4, femoral=4/4, popliteal=4/4, dorsalis pedis=4/4,   Skin: normal coloration and turgor, no rashes, no suspicious skin lesions noted  Psych: normal    Lab Review: not applicable  Assessment: Stable    Plan:              ECG, UA, fasting labs, and hemoccults  Hyperlipidemia - await labs, same regimen pending results    Hearing - ongoing, continue to monitor  Patient advised to fasten and secure all throw rugs  Patient advised to obtain non slip bath mats       DANIEL Miranda MD

## 2020-09-02 ENCOUNTER — OFFICE VISIT (OUTPATIENT)
Dept: ORTHOPEDIC SURGERY | Age: 70
End: 2020-09-02
Payer: MEDICARE

## 2020-09-02 VITALS — BODY MASS INDEX: 26.82 KG/M2 | TEMPERATURE: 97.3 F | HEIGHT: 65 IN | WEIGHT: 161 LBS | RESPIRATION RATE: 12 BRPM

## 2020-09-02 PROCEDURE — 99213 OFFICE O/P EST LOW 20 MIN: CPT | Performed by: PHYSICIAN ASSISTANT

## 2020-09-02 NOTE — PROGRESS NOTES
Follow up: Rosina Maldonado  1950  [id number]         Chief Complaint   Patient presents with    Neck Pain     F/u [facility] PT         HISTORY OF PRESENT ILLNESS:  Ms. Waqas Arellano is a 72 y.o. female returns for a follow up visit for multiple medical problems. Her current presenting problems are   1. Cervical radiculopathy    2. DDD (degenerative disc disease), cervical    3. Cervical spondylosis without myelopathy    . As per information/history obtained from the PADT(patient assessment and documentation tool) - She complains of pain in the neck with radiation to the shoulders Left She rates the pain 0/10 and describes it as N/A. Pain is made worse by: nothing. She denies side effects from the current pain regimen. Patient reports that since the last follow up visit the physical functioning is better, family/social relationships are better, mood is better and sleep patterns are better, and that the overall functioning is better. Patient denies neurological bowel or bladder. The patient presents today in follow-up after last being seen on 8/4/2020. She has attended physical therapy from 7/31/2020 through 8/18/2020 for total of 6 visits. The patient describes that she is doing very well at this time and does not have any pain to note today. The patient describes that she has been doing yoga and using meloxicam as needed when her pain increases. She is also been compliant with a home exercise program.      Associated signs and symptoms:   Neurogenic bowel or bladder symptoms:  no   Perceived weakness:  no   Difficulty walking:  no            Past medical, surgical, social and family history reviewed with the patient. Past Medical History:   Past Medical History:   Diagnosis Date    Chest pain Sept.24, 2015    Coronary angiogram by Dr. Sahra Catalan revealed completely normal coronary arteries with normal LVEF and no regional wall notion abnormalities.     Encounter for screening mammogram for breast cancer 10/21/2015    Negative    Herpes zoster 2015    Chest wall    Hyperlipidemia     Hyperthyroidism     treated    Obstructive sleep apnea     Dr. Pipo Rios    Osteopenia DEXA -     Lumbar T score  -0.6 and Hip T score -0.4    Osteopenia DEXA -     Lumbar T score -0.7 and Hip T score -0.7    Osteopenia DEXA-May, 2018    Lumbar T score of -0.7 and hip T score of -0.4  FRAX - 8.7/0.8 %    Other screening mammogram 2011    Negative    Other screening mammogram     Negative    Other screening mammogram Aug. 27, 2013    Negative     Other screening mammogram     Negative    Screening mammogram for high-risk patient *2019    Negative    Screening mammogram, encounter for *2016    Begative    Screening mammogram, encounter for *2017    Negative    Screening mammogram, encounter for *2018    Benign      Past Surgical History:     Past Surgical History:   Procedure Laterality Date    CARDIAC CATHETERIZATION  2015    Dr. Roland Harrington   (  )    Dr. Kevin Bustamante - normal.    COLONOSCOPY  2014 (  )    Dr. Kalpesh Partida - mild diverticulosis    Arliss Courtney  *    Dr. Axel Groves - bilaterally     Current Medications:     Current Outpatient Medications:     simvastatin (ZOCOR) 20 MG tablet, TAKE ONE TABLET BY MOUTH ONCE NIGHTLY, Disp: 90 tablet, Rfl: 3  Allergies:  Patient has no known allergies. Social History:    reports that she quit smoking about 27 years ago. Her smoking use included cigarettes. She has a 20.00 pack-year smoking history. She has never used smokeless tobacco. She reports current alcohol use. Family History:   Family History   Problem Relation Age of Onset    Heart Attack Father 52        , MI.    Hypertension Mother 80        , hypertension, dementia.        REVIEW OF SYSTEMS:   CONSTITUTIONAL: Denies unexplained weight loss, fevers, chills or fatigue  NEUROLOGICAL: Denies unsteady gait or progressive weakness  MUSCULOSKELETAL: Denies joint swelling or redness  GI: Denies nausea, vomiting, diarrhea   : Denies bowel or bladder issues       PHYSICAL EXAM:    Vitals: Temperature 97.3 °F (36.3 °C), resp. rate 12, height 5' 5\" (1.651 m), weight 161 lb (73 kg), not currently breastfeeding. GENERAL EXAM:  · General Apparence: Patient is adequately groomed with no evidence of malnutrition. · Psychiatric: Orientation: The patient is oriented to time, place and person. The patient's mood and affect are appropriate   · Vascular: Examination reveals no swelling and palpation reveals no tenderness in upper or lower extremities. Good capillary refill. · The lymphatic examination of the neck, axillae and groin reveals all areas to be without enlargement or induration  · Sensation is intact without deficit in the upper and lower extremities to light touch and pinprick  · Coordination of the upper and lower extremities are normal.    CERVICAL EXAMINATION:  · Inspection: Local inspection shows no step-off or bruising. Cervical alignment is normal. No instability is noted. · Palpation and Percussion: No evidence of tenderness at the midline. Paraspinal tenderness is not present. There is no paraspinal spasm. Skin:There are no rashes, ulcerations or lesions  · Range of Motion:  pain-free ROM   · Strength: 5/5 bilateral upper extremities  · Special Tests:   Spurling's and Greer's are negative bilaterally. Contreras and Impingement tests are negative bilaterally. · Skin:There are no rashes, ulcerations or lesions in right & left upper extremities. · Reflexes: Bilaterally triceps, biceps and brachioradialis are 2+. Clonus absent bilaterally at the feet. No pathological reflexes are noted.   · Gait & station: normal, patient ambulates without assistance and no ataxia  · Additional Examinations:  · RIGHT UPPER EXTREMITY:  Inspection/examination of the right upper extremity does not show any tenderness, deformity or injury. Range of motion is unremarkable and pain-free. There is no gross instability. There are no rashes, ulcerations or lesions. Strength and tone are normal. No atrophy or abnormal movements are noted. · LEFT UPPER EXTREMITY: Inspection/examination of the left upper extremity does not show any tenderness, deformity or injury. Range of motion is unremarkable and pain-free. There is no gross instability. There are no rashes, ulcerations or lesions. Strength and tone are normal. No atrophy or abnormal movements are noted. · RIGHT LOWER EXTREMITY: Inspection/examination of the right lower extremity does not show any tenderness, deformity or injury. Range of motion is normal and pain-free. There is no gross instability. There are no rashes, ulcerations or lesions. Strength and tone are normal. No atrophy or abnormal movements are noted. · LEFT LOWER EXTREMITY:  Inspection/examination of the left lower extremity does not show any tenderness, deformity or injury. Range of motion is normal and pain-free. There is no gross instability. There are no rashes, ulcerations or lesions. Strength and tone are normal. No atrophy or abnormal movements are noted. Diagnostic Testing:    No new diagnostics.    Results for orders placed or performed in visit on 10/10/19   CK   Result Value Ref Range    Total  U/L   TSH without Reflex   Result Value Ref Range    TSH 2.050 uIU/mL   Lipid Panel   Result Value Ref Range    Cholesterol, Total 177 mg/dL    HDL 77 (A) 35 - 70 mg/dL    LDL Calculated 85 0 - 160 mg/dL    Triglycerides 74 mg/dL    Chol/HDL Ratio      VLDL 15 mg/dL   Comprehensive Metabolic Panel   Result Value Ref Range    Sodium 143 mmol/L    Chloride 105 mmol/L    Potassium 4.5 mmol/L    BUN 26 mg/dL    CREATININE 1.09     Glucose 95 mg/dL    AST 21 U/L    ALT 16 U/L    Calcium 9.7 mg/dL    Total Protein 6.6     CO2 23 mmol/L    Alb 4.5     Alkaline Phosphatase 69 U/L    Total Bilirubin 0.3 0.1 - 1.4 mg/dL    Gfr Calculated      Anion Gap  mmol/L   CBC Auto Differential   Result Value Ref Range    WBC 4.1 10^3/mL    RBC 4.50 10^6/µL    Hemoglobin 13.6 12.0 - 16.0 g/dL    Hematocrit 38.8 36 - 46 %    MCV 86 fL    MCH 30.2 pg    MCHC 35.1 g/dL    Platelets 341 K/µL    RDW 13.0 %    MPV  fL    Neutrophils % 52 %    Lymphocytes % 36 %    Monocytes % 6 %    Eosinophils % 5 %    Basophils % 1 %    Neutrophils Absolute 2.1 /µL    Lymphocytes Absolute 1.5 /µL    Monocytes Absolute 0.3 /µL    Eosinophils Absolute 0.2 /µL    Basophils Absolute 0.1 /µL     Impression:       1. Cervical radiculopathy    2. DDD (degenerative disc disease), cervical    3. Cervical spondylosis without myelopathy        Plan:  Clinical Course: Above diagnoses are improving     I discussed the diagnosis and the treatment options with Ale Herrera today. In Summary:  The various treatment options were outlined and discussed with Ale Herrera including:  Conservative care options: physical therapy, ice, medications, bracing, and activity modification. The indications for therapeutic injections. The indications for additional imaging/laboratory studies. The indications for (possible future) interventions. After considering the various options discussed, Ale Herrera elected to pursue a course of treatment that includes the followin. Medications:  No further recommendations for new medications. The patient can continue to take meloxicam as needed. 2. PT:  Encouraged to continue with Home exercise program.    3. Further studies: No further studies. 4. Interventional: No further interventions at this time. 5. Follow up:  2-3 months      Ale Herrera was instructed to call the office if her symptoms worsen or if new symptoms appear prior to the next scheduled visit.  She is specifically instructed to contact the office between now & her scheduled appointment if she has concerns related to her condition or if she needs assistance in scheduling the above tests. She is welcome to call for an appointment sooner if she has any additional concerns or questions. OPHELIA Mohan PA-C  Board Certified by the [employer] on Certification of Physician Assistants  Camron Doll 59  Partner of [facility] ([facility])          This dictation was performed with a verbal recognition program [facility]) and it was checked for errors. It is possible that there are still dictated errors within this office note. If so, please bring any errors to my attention for an addendum. All efforts were made to ensure that this office note is accurate.

## 2020-09-05 LAB
ALBUMIN SERPL-MCNC: 4.4 G/DL
ALP BLD-CCNC: 71 U/L
ALT SERPL-CCNC: 22 U/L
ANION GAP SERPL CALCULATED.3IONS-SCNC: NORMAL MMOL/L
AST SERPL-CCNC: 26 U/L
BASOPHILS ABSOLUTE: 0.1 /ΜL
BASOPHILS RELATIVE PERCENT: 2 %
BILIRUB SERPL-MCNC: 0.4 MG/DL (ref 0.1–1.4)
BILIRUBIN, URINE: NEGATIVE
BLOOD, URINE: NEGATIVE
BUN BLDV-MCNC: 18 MG/DL
CALCIUM SERPL-MCNC: 9.9 MG/DL
CHLORIDE BLD-SCNC: 102 MMOL/L
CHOLESTEROL, TOTAL: 181 MG/DL
CHOLESTEROL/HDL RATIO: ABNORMAL
CLARITY: CLEAR
CO2: 23 MMOL/L
COLOR: YELLOW
CREAT SERPL-MCNC: 1.07 MG/DL
EOSINOPHILS ABSOLUTE: 0.2 /ΜL
EOSINOPHILS RELATIVE PERCENT: 4 %
GFR CALCULATED: NORMAL
GLUCOSE BLD-MCNC: 100 MG/DL
GLUCOSE URINE: NEGATIVE
HCT VFR BLD CALC: 38.7 % (ref 36–46)
HDLC SERPL-MCNC: 82 MG/DL (ref 35–70)
HEMOGLOBIN: 13.3 G/DL (ref 12–16)
KETONES, URINE: NEGATIVE
LDL CHOLESTEROL CALCULATED: 85 MG/DL (ref 0–160)
LEUKOCYTE ESTERASE, URINE: NEGATIVE
LYMPHOCYTES ABSOLUTE: 1.2 /ΜL
LYMPHOCYTES RELATIVE PERCENT: 30 %
MCH RBC QN AUTO: 30.9 PG
MCHC RBC AUTO-ENTMCNC: 34.4 G/DL
MCV RBC AUTO: 90 FL
MONOCYTES ABSOLUTE: 0.3 /ΜL
MONOCYTES RELATIVE PERCENT: 7 %
NEUTROPHILS ABSOLUTE: 2.3 /ΜL
NEUTROPHILS RELATIVE PERCENT: 57 %
NITRITE, URINE: NEGATIVE
NONHDLC SERPL-MCNC: ABNORMAL MG/DL
PDW BLD-RTO: 11.8 %
PH UA: 7.5 (ref 4.5–8)
PLATELET # BLD: 192 K/ΜL
PMV BLD AUTO: NORMAL FL
POTASSIUM SERPL-SCNC: 4.3 MMOL/L
PROTEIN UA: NEGATIVE
RBC # BLD: 4.31 10^6/ΜL
SODIUM BLD-SCNC: 140 MMOL/L
SPECIFIC GRAVITY, URINE: 1.02
TOTAL CK: 198 U/L
TOTAL PROTEIN: 6.5
TRIGL SERPL-MCNC: 77 MG/DL
TSH SERPL DL<=0.05 MIU/L-ACNC: 1.88 UIU/ML
UROBILINOGEN, URINE: NORMAL
VLDLC SERPL CALC-MCNC: 14 MG/DL
WBC # BLD: 4 10^3/ML

## 2020-09-21 LAB
CONTROL: NORMAL
HEMOCCULT STL QL: NEGATIVE

## 2020-09-21 PROCEDURE — 82274 ASSAY TEST FOR BLOOD FECAL: CPT | Performed by: INTERNAL MEDICINE

## 2020-09-22 DIAGNOSIS — Z13.29 THYROID DISORDER SCREEN: ICD-10-CM

## 2020-09-22 DIAGNOSIS — E78.5 HYPERLIPIDEMIA, UNSPECIFIED HYPERLIPIDEMIA TYPE: ICD-10-CM

## 2020-11-23 ENCOUNTER — TELEPHONE (OUTPATIENT)
Dept: FAMILY MEDICINE CLINIC | Age: 70
End: 2020-11-23

## 2020-11-23 ENCOUNTER — OFFICE VISIT (OUTPATIENT)
Dept: PRIMARY CARE CLINIC | Age: 70
End: 2020-11-23
Payer: MEDICARE

## 2020-11-23 PROCEDURE — 99211 OFF/OP EST MAY X REQ PHY/QHP: CPT | Performed by: NURSE PRACTITIONER

## 2020-11-23 NOTE — TELEPHONE ENCOUNTER
----- Message from Obie Hernandez sent at 11/23/2020 12:38 PM EST -----  Subject: Message to Provider    QUESTIONS  Information for Provider? Pts  was tested for Covid today and pt   has appointment tomorrow. Wants to know if she should still come or   reschedule.   ---------------------------------------------------------------------------  --------------  CALL BACK INFO  What is the best way for the office to contact you? OK to leave message on   voicemail  Preferred Call Back Phone Number? [phone]  ---------------------------------------------------------------------------  --------------  SCRIPT ANSWERS  Relationship to Patient?  Self

## 2020-11-23 NOTE — PROGRESS NOTES
Sanchez De La Garza received a viral test for COVID-19. They were educated on isolation and quarantine as appropriate. For any symptoms, they were directed to seek care from their PCP, given contact information to establish with a doctor, directed to an urgent care or the emergency room.

## 2020-11-23 NOTE — TELEPHONE ENCOUNTER
You can sched her in about a month is there is availability. Please look at the sched and see when there might be time. Is there an urgent need? Also , she could not be sched any sooner than 2 weeks and I would prefer longer if her  has covid. If her  was positive, family members need to wait  2 weeks to see if symptoms occur.

## 2020-11-23 NOTE — TELEPHONE ENCOUNTER
PT HAD A COVID TEST YESTERDAY  SHE HAS NO SYMPTOMS  SHE WENT WITH HER  TO GET COVID TESTED WHO WASN'T FEELING TOO WELL- SHE JUST GOT IT DONE BECAUSE SHE GOT IT DONE WITH HIM  SHE HAS A NP APPT WITH DR. Kidd Lips  CAN SHE STILL COME TO THE APPT OR DO WE HAVE TO R/S SINCE SHE WAS TOLD TO QUARANTINE  FOR 7 DAYS  IF SHE NEEDS TO R/S DOES SHE HAVE TO WAIT TILL THE NEXT AVAILABLE NEXT YEAR?     PLEASE ADVISE

## 2020-11-23 NOTE — TELEPHONE ENCOUNTER
She will need to reschedule NP appt  We cannot have a pt in the office with a pending COVID test  Thank you

## 2020-11-24 LAB — SARS-COV-2, NAA: NOT DETECTED

## 2020-12-23 ENCOUNTER — OFFICE VISIT (OUTPATIENT)
Dept: FAMILY MEDICINE CLINIC | Age: 70
End: 2020-12-23
Payer: MEDICARE

## 2020-12-23 VITALS
HEART RATE: 84 BPM | RESPIRATION RATE: 14 BRPM | SYSTOLIC BLOOD PRESSURE: 128 MMHG | OXYGEN SATURATION: 95 % | BODY MASS INDEX: 27.22 KG/M2 | HEIGHT: 65 IN | TEMPERATURE: 97.3 F | DIASTOLIC BLOOD PRESSURE: 80 MMHG | WEIGHT: 163.4 LBS

## 2020-12-23 DIAGNOSIS — Z01.84 IMMUNITY STATUS TESTING: ICD-10-CM

## 2020-12-23 PROCEDURE — 99203 OFFICE O/P NEW LOW 30 MIN: CPT | Performed by: INTERNAL MEDICINE

## 2020-12-23 SDOH — HEALTH STABILITY: MENTAL HEALTH: HOW OFTEN DO YOU HAVE A DRINK CONTAINING ALCOHOL?: 2-3 TIMES A WEEK

## 2020-12-23 SDOH — HEALTH STABILITY: MENTAL HEALTH: HOW MANY STANDARD DRINKS CONTAINING ALCOHOL DO YOU HAVE ON A TYPICAL DAY?: NOT ASKED

## 2020-12-23 ASSESSMENT — ENCOUNTER SYMPTOMS
DIARRHEA: 0
VOMITING: 0
NAUSEA: 0
CONSTIPATION: 0
EYE PAIN: 0
WHEEZING: 0
COLOR CHANGE: 0
SHORTNESS OF BREATH: 0

## 2020-12-23 NOTE — PROGRESS NOTES
Meningococcal (ACWY) vaccine  Aged Out      Social History     Tobacco Use    Smoking status: Former Smoker     Packs/day: 1.00     Years: 20.00     Pack years: 20.00     Types: Cigarettes     Quit date: 1993     Years since quittin.9    Smokeless tobacco: Never Used   Substance Use Topics    Alcohol use: Yes     Frequency: 2-3 times a week     Comment: occasional    Drug use: Never      Family History   Problem Relation Age of Onset    Heart Attack Father 52        , MI.    Hypertension Mother 80        , hypertension, dementia. Prior to Visit Medications    Medication Sig Taking? Authorizing Provider   simvastatin (ZOCOR) 20 MG tablet TAKE ONE TABLET BY MOUTH ONCE NIGHTLY Yes V Judith Jimenez MD     Patient Active Problem List   Diagnosis    Hyperlipidemia        LABS:   Lab Results   Component Value Date    GLUCOSE 100 2020     Lab Results   Component Value Date     2020    K 4.3 2020    CREATININE 1.07 2020     Cholesterol, Total   Date Value Ref Range Status   2020 181 mg/dL Final     LDL Calculated   Date Value Ref Range Status   2020 85 0 - 160 mg/dL Final     HDL   Date Value Ref Range Status   2020 82 (A) 35 - 70 mg/dL Final     Triglycerides   Date Value Ref Range Status   2020 77 mg/dL Final     Lab Results   Component Value Date    ALT 22 2020    AST 26 2020    ALKPHOS 71 2020    BILITOT 0.4 2020      Lab Results   Component Value Date    WBC 4.0 2020    HGB 13.3 2020    HCT 38.7 2020    MCV 90 2020     2020     TSH (uIU/mL)   Date Value   2020 1.880     No results found for: LABA1C  No results found for: PSA, PSADIA     PHYSICAL EXAM:  /84   Pulse 84   Temp 97.3 °F (36.3 °C)   Resp 14   Ht 5' 5\" (1.651 m)   Wt 163 lb 6.4 oz (74.1 kg)   SpO2 95%   BMI 27.19 kg/m²    Physical Exam  Constitutional:       Appearance: Normal appearance.  She is well-developed. HENT:      Head: Normocephalic and atraumatic. Right Ear: Tympanic membrane and ear canal normal.      Left Ear: Tympanic membrane and ear canal normal.   Eyes:      General: No scleral icterus. Conjunctiva/sclera: Conjunctivae normal.   Neck:      Musculoskeletal: Neck supple. Thyroid: No thyromegaly. Cardiovascular:      Rate and Rhythm: Normal rate and regular rhythm. Heart sounds: No murmur. Pulmonary:      Effort: Pulmonary effort is normal. No respiratory distress. Breath sounds: Normal breath sounds. No wheezing. Abdominal:      General: There is no distension. Palpations: There is no mass. Tenderness: There is no abdominal tenderness. Musculoskeletal:         General: No swelling or deformity. Skin:     General: Skin is warm and dry. Findings: No rash. Neurological:      General: No focal deficit present. Mental Status: She is alert. Motor: No abnormal muscle tone. Comments: Motor 5/5   Upper /and lower ext bilat  Speech clear   Psychiatric:         Mood and Affect: Mood normal.         Behavior: Behavior normal.         Thought Content: Thought content normal.         Judgment: Judgment normal.       BP Readings from Last 5 Encounters:   12/23/20 134/84   08/27/20 118/76   08/29/19 112/66   04/23/18 110/78   06/21/17 122/78       Wt Readings from Last 5 Encounters:   12/23/20 163 lb 6.4 oz (74.1 kg)   09/02/20 161 lb (73 kg)   08/27/20 161 lb 3.2 oz (73.1 kg)   08/25/20 161 lb (73 kg)   08/04/20 161 lb (73 kg)      Uyen was seen today for new patient. Diagnoses and all orders for this visit:    Establishing care with new doctor, encounter for    Immunity status testing  -     Covid-19, Antibody, Total; Future    Hypercholesterolemia    Exposure to COVID-19 virus  Reviewed recent labs  Cholesterol excellent     new pt .   Walks a lot   In very good physical shape   had covid about a month ago  She felt ill for one day and she request abx test  Asked about vit d  bp little high   Will repeat bp and have her daughter watch it at home  Fu  4-6 months medicare annual wellness

## 2020-12-23 NOTE — PATIENT INSTRUCTIONS
Needs 1200 mg calcium day    Needs 9222-4420 iu of vit d day otc     [place] bp   Less  130/80     BP Readings from Last 5 Encounters:   12/23/20 134/84   08/27/20 118/76   08/29/19 112/66   04/23/18 110/78   06/21/17 122/78

## 2020-12-24 LAB — SARS-COV-2 ANTIBODY, TOTAL: NEGATIVE

## 2021-01-11 ENCOUNTER — HOSPITAL ENCOUNTER (OUTPATIENT)
Dept: MAMMOGRAPHY | Age: 71
Discharge: HOME | End: 2021-01-11
Payer: MEDICARE

## 2021-01-11 DIAGNOSIS — Z12.31 VISIT FOR SCREENING MAMMOGRAM: ICD-10-CM

## 2021-01-11 PROCEDURE — 77063 BREAST TOMOSYNTHESIS BI: CPT

## 2021-05-03 ENCOUNTER — OFFICE VISIT (OUTPATIENT)
Dept: FAMILY MEDICINE CLINIC | Age: 71
End: 2021-05-03
Payer: MEDICARE

## 2021-05-03 VITALS
RESPIRATION RATE: 14 BRPM | HEIGHT: 65 IN | WEIGHT: 157.4 LBS | OXYGEN SATURATION: 96 % | HEART RATE: 91 BPM | DIASTOLIC BLOOD PRESSURE: 66 MMHG | SYSTOLIC BLOOD PRESSURE: 124 MMHG | BODY MASS INDEX: 26.23 KG/M2

## 2021-05-03 DIAGNOSIS — E78.00 HYPERCHOLESTEROLEMIA: Primary | ICD-10-CM

## 2021-05-03 PROCEDURE — 99213 OFFICE O/P EST LOW 20 MIN: CPT | Performed by: INTERNAL MEDICINE

## 2021-05-03 ASSESSMENT — PATIENT HEALTH QUESTIONNAIRE - PHQ9
SUM OF ALL RESPONSES TO PHQ QUESTIONS 1-9: 0
SUM OF ALL RESPONSES TO PHQ QUESTIONS 1-9: 0
SUM OF ALL RESPONSES TO PHQ9 QUESTIONS 1 & 2: 0
SUM OF ALL RESPONSES TO PHQ QUESTIONS 1-9: 0
1. LITTLE INTEREST OR PLEASURE IN DOING THINGS: 0

## 2021-05-03 NOTE — PROGRESS NOTES
5/3/2021    Chief Complaint   Patient presents with    Hypertension     follow up with bp       HPI    Dx with sleep apnea and uses cpap. Taking 1/2 of  25 mg of doxylamine 3 times a week. Sleeps well with this    Lost some wt with wt watches  Walks  3-5 miles a day    On zocor for cholesterol  Gets cramping periodicall  Mostly leg cramps    Strong family ho heart dx  Father  in  42's     Had a cath about 5 yrs ago and was told she was ok  At the time had chest pain but broke out with shingles  Eats well     had covid and she tested negative. One day did not feel well. Was negative for antibody.   Had moderna vaccine    No results found for: Madiha Alejandra    Lab Results   Component Value Date     2020     2019     2018    K 4.3 2020    K 4.5 2019    K 4.5 2018     2020     2019     2018    CO2 23 2020    CO2 23 2019    CO2 23 2018    BUN 18 2020    BUN 26 2019    BUN 28 2018    CREATININE 1.07 2020    CREATININE 1.09 2019    CREATININE 0.97 2018    GLUCOSE 100 2020    GLUCOSE 95 2019    GLUCOSE 89 2018    CALCIUM 9.9 2020    CALCIUM 9.7 2019    CALCIUM 9.5 2018       Lab Results   Component Value Date    CHOL 181 2020    CHOL 177 2019    CHOL 170 2018    TRIG 77 2020    TRIG 74 2019    TRIG 90 2018    HDL 82 (A) 2020    HDL 77 (A) 2019    HDL 70 2018    LDLCALC 85 2020    LDLCALC 85 2019    LDLCALC 82 2018    LDLDIRECT 71 2013    LDLDIRECT 89 2012       Lab Results   Component Value Date    ALT 22 2020    ALT 16 2019    ALT 16 2018    AST 26 2020    AST 21 2019    AST 21 2018       Lab Results   Component Value Date    TSH 1.880 2020    TSH 2.050 2019    TSH 1.800 2018       Lab Results Component Value Date    WBC 4.0 2020    WBC 4.1 2019    WBC 4.4 2018    HGB 13.3 2020    HGB 13.6 2019    HGB 13.1 2018    HCT 38.7 2020    HCT 38.8 2019    HCT 40.2 2018    MCV 90 2020    MCV 86 2019    MCV 91 2018     2020     2019     2018       No results found for: INR     No results found for: PSA     No results found for: LABURIC       Review of Systems    Health Maintenance   Topic Date Due    Shingles Vaccine (3 of 3) 2021    DTaP/Tdap/Td vaccine (1 - Tdap) 2099 (Originally 1970)    Annual Wellness Visit (AWV)  2021    Lipid screen  2021    Colon Cancer Screen FIT/FOBT  2021    Breast cancer screen  2023    DEXA (modify frequency per FRAX score)  Completed    Flu vaccine  Completed    Pneumococcal 65+ years Vaccine  Completed    COVID-19 Vaccine  Completed    Hepatitis C screen  Completed    Hepatitis A vaccine  Aged Out    Hepatitis B vaccine  Aged Out    Hib vaccine  Aged Out    Meningococcal (ACWY) vaccine  Aged Out      Social History     Tobacco Use    Smoking status: Former Smoker     Packs/day: 1.00     Years: 20.00     Pack years: 20.00     Types: Cigarettes     Quit date: 1993     Years since quittin.3    Smokeless tobacco: Never Used   Substance Use Topics    Alcohol use: Yes     Frequency: 2-3 times a week     Comment: occasional    Drug use: Never      Family History   Problem Relation Age of Onset    Heart Attack Father 52        , MI.    Hypertension Mother 80        , hypertension, dementia. Prior to Visit Medications    Medication Sig Taking?  Authorizing Provider   simvastatin (ZOCOR) 20 MG tablet TAKE ONE TABLET BY MOUTH ONCE NIGHTLY Yes Blaise Barone MD     Patient Active Problem List   Diagnosis    Hyperlipidemia        LABS:   Lab Results   Component Value Date    GLUCOSE 100 2020 Lab Results   Component Value Date     09/04/2020    K 4.3 09/04/2020    CREATININE 1.07 09/04/2020     Cholesterol, Total   Date Value Ref Range Status   09/04/2020 181 mg/dL Final     LDL Calculated   Date Value Ref Range Status   09/04/2020 85 0 - 160 mg/dL Final     HDL   Date Value Ref Range Status   09/04/2020 82 (A) 35 - 70 mg/dL Final     Triglycerides   Date Value Ref Range Status   09/04/2020 77 mg/dL Final     Lab Results   Component Value Date    ALT 22 09/04/2020    AST 26 09/04/2020    ALKPHOS 71 09/04/2020    BILITOT 0.4 09/04/2020      Lab Results   Component Value Date    WBC 4.0 09/04/2020    HGB 13.3 09/04/2020    HCT 38.7 09/04/2020    MCV 90 09/04/2020     09/04/2020     TSH (uIU/mL)   Date Value   09/04/2020 1.880     No results found for: LABA1C  No results found for: PSA, PSADIA     PHYSICAL EXAM:  /66   Pulse 91   Resp 14   Ht 5' 5\" (1.651 m)   Wt 157 lb 6.4 oz (71.4 kg)   SpO2 96%   BMI 26.19 kg/m²    Physical Exam  Constitutional:       Appearance: She is well-developed. HENT:      Head: Normocephalic and atraumatic. Cardiovascular:      Rate and Rhythm: Normal rate and regular rhythm. Heart sounds: No murmur. Pulmonary:      Breath sounds: Normal breath sounds. No wheezing. Abdominal:      Palpations: Abdomen is soft. Tenderness: There is no abdominal tenderness. Skin:     General: Skin is warm and dry. Psychiatric:         Behavior: Behavior normal.       BP Readings from Last 5 Encounters:   05/03/21 124/66   12/23/20 128/80   08/27/20 118/76   08/29/19 112/66   04/23/18 110/78       Wt Readings from Last 5 Encounters:   05/03/21 157 lb 6.4 oz (71.4 kg)   12/23/20 163 lb 6.4 oz (74.1 kg)   09/02/20 161 lb (73 kg)   08/27/20 161 lb 3.2 oz (73.1 kg)   08/25/20 161 lb (73 kg)        Uyen was seen today for hypertension. Diagnoses and all orders for this visit:    Hypercholesterolemia  -     Lipid Panel;  Future  -     Comprehensive

## 2021-09-27 ENCOUNTER — OFFICE VISIT (OUTPATIENT)
Dept: FAMILY MEDICINE CLINIC | Age: 71
End: 2021-09-27
Payer: MEDICARE

## 2021-09-27 VITALS
HEIGHT: 64 IN | OXYGEN SATURATION: 98 % | RESPIRATION RATE: 10 BRPM | HEART RATE: 82 BPM | WEIGHT: 155 LBS | SYSTOLIC BLOOD PRESSURE: 120 MMHG | DIASTOLIC BLOOD PRESSURE: 78 MMHG | BODY MASS INDEX: 26.46 KG/M2

## 2021-09-27 DIAGNOSIS — Z00.00 ROUTINE GENERAL MEDICAL EXAMINATION AT A HEALTH CARE FACILITY: ICD-10-CM

## 2021-09-27 DIAGNOSIS — Z00.00 ENCOUNTER FOR MEDICARE ANNUAL WELLNESS EXAM: Primary | ICD-10-CM

## 2021-09-27 PROCEDURE — G0439 PPPS, SUBSEQ VISIT: HCPCS | Performed by: INTERNAL MEDICINE

## 2021-09-27 PROCEDURE — 90694 VACC AIIV4 NO PRSRV 0.5ML IM: CPT | Performed by: INTERNAL MEDICINE

## 2021-09-27 PROCEDURE — G0008 ADMIN INFLUENZA VIRUS VAC: HCPCS | Performed by: INTERNAL MEDICINE

## 2021-09-27 RX ORDER — MELOXICAM 7.5 MG/1
7.5 TABLET ORAL DAILY
Qty: 30 TABLET | Refills: 1 | Status: SHIPPED | OUTPATIENT
Start: 2021-09-27

## 2021-09-27 RX ORDER — MELOXICAM 7.5 MG/1
TABLET ORAL
COMMUNITY
Start: 2020-07-29 | End: 2021-09-27 | Stop reason: SDUPTHER

## 2021-09-27 ASSESSMENT — PATIENT HEALTH QUESTIONNAIRE - PHQ9
2. FEELING DOWN, DEPRESSED OR HOPELESS: 0
SUM OF ALL RESPONSES TO PHQ QUESTIONS 1-9: 0
SUM OF ALL RESPONSES TO PHQ QUESTIONS 1-9: 0
1. LITTLE INTEREST OR PLEASURE IN DOING THINGS: 0
SUM OF ALL RESPONSES TO PHQ9 QUESTIONS 1 & 2: 0
SUM OF ALL RESPONSES TO PHQ QUESTIONS 1-9: 0

## 2021-09-27 ASSESSMENT — LIFESTYLE VARIABLES
HOW OFTEN DO YOU HAVE SIX OR MORE DRINKS ON ONE OCCASION: 0
HOW MANY STANDARD DRINKS CONTAINING ALCOHOL DO YOU HAVE ON A TYPICAL DAY: 0
AUDIT TOTAL SCORE: 4
HOW OFTEN DURING THE LAST YEAR HAVE YOU HAD A FEELING OF GUILT OR REMORSE AFTER DRINKING: 0
HOW OFTEN DURING THE LAST YEAR HAVE YOU BEEN UNABLE TO REMEMBER WHAT HAPPENED THE NIGHT BEFORE BECAUSE YOU HAD BEEN DRINKING: 0
HAVE YOU OR SOMEONE ELSE BEEN INJURED AS A RESULT OF YOUR DRINKING: 0
AUDIT-C TOTAL SCORE: 4
HOW OFTEN DO YOU HAVE A DRINK CONTAINING ALCOHOL: 4
HAS A RELATIVE, FRIEND, DOCTOR, OR ANOTHER HEALTH PROFESSIONAL EXPRESSED CONCERN ABOUT YOUR DRINKING OR SUGGESTED YOU CUT DOWN: 0
HOW OFTEN DURING THE LAST YEAR HAVE YOU NEEDED AN ALCOHOLIC DRINK FIRST THING IN THE MORNING TO GET YOURSELF GOING AFTER A NIGHT OF HEAVY DRINKING: 0
HOW OFTEN DURING THE LAST YEAR HAVE YOU FOUND THAT YOU WERE NOT ABLE TO STOP DRINKING ONCE YOU HAD STARTED: 0
HOW OFTEN DURING THE LAST YEAR HAVE YOU FAILED TO DO WHAT WAS NORMALLY EXPECTED FROM YOU BECAUSE OF DRINKING: 0

## 2021-09-27 NOTE — PATIENT INSTRUCTIONS
Personalized Preventive Plan for Mairlin Abarca - 9/27/2021  Medicare offers a range of preventive health benefits. Some of the tests and screenings are paid in full while other may be subject to a deductible, co-insurance, and/or copay. Some of these benefits include a comprehensive review of your medical history including lifestyle, illnesses that may run in your family, and various assessments and screenings as appropriate. After reviewing your medical record and screening and assessments performed today your provider may have ordered immunizations, labs, imaging, and/or referrals for you. A list of these orders (if applicable) as well as your Preventive Care list are included within your After Visit Summary for your review. Other Preventive Recommendations:    · A preventive eye exam performed by an eye specialist is recommended every 1-2 years to screen for glaucoma; cataracts, macular degeneration, and other eye disorders. · A preventive dental visit is recommended every 6 months. · Try to get at least 150 minutes of exercise per week or 10,000 steps per day on a pedometer . · Order or download the FREE \"Exercise & Physical Activity: Your Everyday Guide\" from [employer]. Call 3-653.400.7325 or search [employer] online. · You need 8498-1940 mg of calcium and 2984-5068 IU of vitamin D per day. It is possible to meet your calcium requirement with diet alone, but a vitamin D supplement is usually necessary to meet this goal.  · When exposed to the sun, use a sunscreen that protects against both UVA and UVB radiation with an SPF of 30 or greater. Reapply every 2 to 3 hours or after sweating, drying off with a towel, or swimming. · Always wear a seat belt when traveling in a car. Always wear a helmet when riding a bicycle or motorcycle.

## 2021-09-27 NOTE — PROGRESS NOTES
Medicare Annual Wellness Visit  Name: Cody Person Date: 2021   MRN: <[id number]> Sex: Female   Age: 80 y.o. Ethnicity: Non- / Non    : 1950 Race: White (non-)      Mariely Ruggiero is here for Medicare AWV    Screenings for behavioral, psychosocial and functional/safety risks, and cognitive dysfunction are all negative except as indicated below. These results, as well as other patient data from the [address] form, are documented in Flowsheets linked to this Encounter. No Known Allergies    Prior to Visit Medications    Medication Sig Taking? Authorizing Provider   simvastatin (ZOCOR) 20 MG tablet TAKE ONE TABLET BY MOUTH ONCE NIGHTLY Yes Nikky Mills MD   meloxicam (MOBIC) 7.5 MG tablet   Historical Provider, MD       Past Medical History:   Diagnosis Date    Chest pain 2015    Coronary angiogram by Dr. Beverly Uribe revealed completely normal coronary arteries with normal LVEF and no regional wall notion abnormalities.     Encounter for screening mammogram for breast cancer 10/21/2015    Negative    Herpes zoster 2015    Chest wall    Hyperlipidemia     Hyperthyroidism     treated    Obstructive sleep apnea     Dr. John Perez    Osteopenia DEXA -     Lumbar T score  -0.6 and Hip T score -0.4    Osteopenia DEXA -     Lumbar T score -0.7 and Hip T score -0.7    Osteopenia DEXA-May, 2018    Lumbar T score of -0.7 and hip T score of -0.4  FRAX - 8.7/0.8 %    Other screening mammogram 2011    Negative    Other screening mammogram     Negative    Other screening mammogram Aug. 27, 2013    Negative     Other screening mammogram     Negative    Screening mammogram for high-risk patient *2019    Negative    Screening mammogram, encounter for *2016    Begative    Screening mammogram, encounter for *2017    Negative    Screening mammogram, encounter for *2018    Benign       Past Surgical History:   Procedure Laterality Date    CARDIAC CATHETERIZATION  2015    Dr. Michael Cooley   (  )    Dr. Bryanna Nobles - normal.    COLONOSCOPY  2014 (  )    Dr. Powell Bella - mild diverticulosis    [facility].    Dr. Nicola Johnson - bilaterally       Family History   Problem Relation Age of Onset    Heart Attack Father 52        , MI.    Hypertension Mother 80        , hypertension, dementia. CareTeam (Including outside providers/suppliers regularly involved in providing care):   Patient Care Team:  Roland Chaney MD as PCP - General (Internal Medicine)  Roland Chaney MD as PCP [facility]  Ryanne Leo MD as Consulting Physician (Ophthalmology)  Barbie Patterson MD as Consulting Physician (Obstetrics & Gynecology)    [facility] Readings from Last 3 Encounters:   21 155 lb (70.3 kg)   21 157 lb 6.4 oz (71.4 kg)   20 163 lb 6.4 oz (74.1 kg)     Vitals:    21 0901   BP: 120/78   Site: Right Upper Arm   Position: Sitting   Cuff Size: Medium Adult   Pulse: 82   Resp: 10   SpO2: 98%   Weight: 155 lb (70.3 kg)   Height: 5' 4\" (1.626 m)     Body mass index is 26.61 kg/m². Based upon direct observation of the patient, evaluation of cognition reveals recent and remote memory intact. Patient's complete Health Risk Assessment and screening values have been reviewed and are found in Flowsheets. The following problems were reviewed today and where indicated follow up appointments were made and/or referrals ordered.     Positive Risk Factor Screenings with Interventions:            Hearing/Vision:  No exam data present  Hearing/Vision  Do you or your family notice any trouble with your hearing that hasn't been managed with hearing aids?: (!) Yes  Do you have difficulty driving, watching TV, or doing any of your daily activities because of your eyesight?: No  Have you had an eye exam within the past year?: Yes  Hearing/Vision Interventions:  · Dx with menieres dx yrs ago  · Has hearing loss     Safety:  Safety  Do you have working smoke detectors?: Yes  Have all throw rugs been removed or fastened?: (!) No  Do you have non-slip mats or surfaces in all bathtubs/showers?: (!) No  Do all of your stairways have a railing or banister?: Yes  Are your doorways, halls and stairs free of clutter?: Yes  Do you always fasten your seatbelt when you are in a car?: Yes  Safety Interventions:  Does not have   \" a lot of that stuff\" in the home  Feels it is ok  Shower floor is not slippery       Personalized Preventive Plan   Current Health Maintenance Status  Immunization History   Administered Date(s) Administered    COVID-19, Moderna, PF, 100mcg/0.5mL 02/10/2021, 03/17/2021    Hepatitis A Adult (Havrix, Vaqta) 11/21/2018    Influenza, High Dose (Fluzone 65 yrs and older) 10/30/2017    Pneumococcal Conjugate 13-valent (Lodema Xochilt) 10/29/2016    Pneumococcal Polysaccharide ([id number]) 10/30/2017    Td, unspecified formulation 04/27/2009    Zoster Live (Zostavax) 10/27/2014        Health Maintenance   Topic Date Due    Annual Wellness Visit (AWV)  Never done    Flu vaccine (1) 09/01/2021    Lipid screen  09/04/2021    Colon Cancer Screen FIT/FOBT  09/21/2021    DTaP/Tdap/Td vaccine (1 - Tdap) 12/31/2099 (Originally 4/28/2009)    Breast cancer screen  01/11/2023    DEXA (modify frequency per FRAX score)  Completed    Shingles Vaccine  Completed    Pneumococcal 65+ years Vaccine  Completed    COVID-19 Vaccine  Completed    Hepatitis C screen  Completed    Hepatitis A vaccine  Aged Out    Hepatitis B vaccine  Aged Out    Hib vaccine  Aged Out    Meningococcal (ACWY) vaccine  Aged Out     Recommendations for [employer] Due: see orders and patient instructions/AVS.  .   Recommended screening schedule for the next 5-10 years is provided to the patient in written form: see Patient Instructions/AVS.       Will be walking 1/2 marathon  Has walked  2 marathon  Never does it for time  Had moderna   Takes meloxicam about once a month for shoulder, neck pain      went to [facility] for colonoscopy   In 2014 colonoscopy with eligio mccoy  9/29/14    IMPRESSION: Normal colonoscopy. Will plan repeat examination in ten years. Mirian Jackson M.D.  NS/[place]    Jolynn Gill was seen today for medicare awv. Diagnoses and all orders for this visit:    Encounter for Medicare annual wellness exam    Routine general medical examination at a health care facility    Other orders  -     meloxicam (MOBIC) 7.5 MG tablet;  Take 1 tablet by mouth daily  -     INFLUENZA, QUADV, ADJUVANTED, 65 YRS =, IM, PF, PREFILL SYR, 0.5ML (FLUAD)    Has labs ordered for a lipid and cmp  Follow up in one yr

## 2021-11-06 RX ORDER — SIMVASTATIN 20 MG
TABLET ORAL
Qty: 90 TABLET | Refills: 2 | Status: SHIPPED | OUTPATIENT
Start: 2021-11-06 | End: 2022-08-26

## 2022-01-17 ENCOUNTER — HOSPITAL ENCOUNTER (OUTPATIENT)
Dept: MAMMOGRAPHY | Age: 72
Discharge: HOME | End: 2022-01-17
Payer: MEDICARE

## 2022-01-17 VITALS — HEIGHT: 65 IN | BODY MASS INDEX: 26.16 KG/M2 | WEIGHT: 157 LBS

## 2022-01-17 DIAGNOSIS — Z12.31 VISIT FOR SCREENING MAMMOGRAM: ICD-10-CM

## 2022-01-17 PROCEDURE — 77063 BREAST TOMOSYNTHESIS BI: CPT

## 2022-01-25 ENCOUNTER — HOSPITAL ENCOUNTER (OUTPATIENT)
Dept: MAMMOGRAPHY | Age: 72
Discharge: HOME | End: 2022-01-25
Payer: MEDICARE

## 2022-01-25 ENCOUNTER — HOSPITAL ENCOUNTER (OUTPATIENT)
Dept: ULTRASOUND IMAGING | Age: 72
Discharge: HOME | End: 2022-01-25
Payer: MEDICARE

## 2022-01-25 DIAGNOSIS — R92.8 ABNORMAL MAMMOGRAM: ICD-10-CM

## 2022-01-25 PROCEDURE — 76642 ULTRASOUND BREAST LIMITED: CPT

## 2022-01-25 PROCEDURE — G0279 TOMOSYNTHESIS, MAMMO: HCPCS

## 2022-04-27 DIAGNOSIS — Z01.84 IMMUNITY STATUS TESTING: ICD-10-CM
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
## 2022-04-27 DIAGNOSIS — Z11.59 ENCOUNTER FOR SCREENING FOR OTHER VIRAL DISEASES: ICD-10-CM

## 2022-04-27 LAB — HBV SURFACE AB TITR SER: <3.5 MIU/ML

## 2022-04-28 LAB
MEASLES IMMUNE (IGG): NORMAL
MUMPS AB IGG: NORMAL
RUBELLA ANTIBODY IGG: NORMAL
VARICELLA-ZOSTER VIRUS AB, IGG: NORMAL

## 2022-05-20 ENCOUNTER — OFFICE VISIT (OUTPATIENT)
Dept: FAMILY MEDICINE CLINIC | Age: 72
End: 2022-05-20
Payer: MEDICARE

## 2022-05-20 VITALS
OXYGEN SATURATION: 98 % | RESPIRATION RATE: 14 BRPM | HEART RATE: 85 BPM | HEIGHT: 65 IN | DIASTOLIC BLOOD PRESSURE: 76 MMHG | BODY MASS INDEX: 26.29 KG/M2 | TEMPERATURE: 98.6 F | WEIGHT: 157.8 LBS | SYSTOLIC BLOOD PRESSURE: 124 MMHG

## 2022-05-20 DIAGNOSIS — G47.33 OSA ON CPAP: ICD-10-CM

## 2022-05-20 DIAGNOSIS — Z99.89 OSA ON CPAP: ICD-10-CM

## 2022-05-20 DIAGNOSIS — R14.0 ABDOMINAL BLOATING: ICD-10-CM

## 2022-05-20 PROBLEM — Z98.890 S/P COLONOSCOPY: Status: ACTIVE | Noted: 2022-05-20

## 2022-05-20 PROCEDURE — 99213 OFFICE O/P EST LOW 20 MIN: CPT | Performed by: NURSE PRACTITIONER

## 2022-05-20 RX ORDER — MULTIVIT-MIN/IRON/FOLIC ACID/K 18-600-40
CAPSULE ORAL
COMMUNITY

## 2022-05-20 ASSESSMENT — ENCOUNTER SYMPTOMS
COUGH: 0
BLOOD IN STOOL: 0
SHORTNESS OF BREATH: 0
CONSTIPATION: 0
ABDOMINAL PAIN: 1
DIARRHEA: 0
VOMITING: 0
ABDOMINAL DISTENTION: 1
NAUSEA: 0

## 2022-05-20 ASSESSMENT — PATIENT HEALTH QUESTIONNAIRE - PHQ9
SUM OF ALL RESPONSES TO PHQ9 QUESTIONS 1 & 2: 0
SUM OF ALL RESPONSES TO PHQ QUESTIONS 1-9: 0
1. LITTLE INTEREST OR PLEASURE IN DOING THINGS: 0
SUM OF ALL RESPONSES TO PHQ QUESTIONS 1-9: 0
2. FEELING DOWN, DEPRESSED OR HOPELESS: 0

## 2022-05-20 NOTE — PROGRESS NOTES
5/20/2022    This is a 80 y.o. female   Chief Complaint   Patient presents with    Abdominal Pain     x10-14 days. bloating. more gas than normal. uncomfortable. last 2 days has been fine. Stephy Ruggiero HPI  Patient reports that for the past 10-14 days has had some abd bloating. Denies nausea, vomiting, diarrhea, blood in stool, heartburn, fever. Took tums a couple of times per day with improvement in symptoms. Denies change in diet. Denies recent travel.  was not ill. Denies ill contacts. Last 2 days has been good and without symptoms. Takes mobic a couple of times per month. Did take a couple of times while she had the bloating for muscle pain. Last colon was 9/2014 and was normal. Repeat 10 years. Reports that she recently was dx with severe sleep apnea. Wearing cpap nightly and tolerating well. She reports that energy level has improve and happy with treatment. Patient Active Problem List   Diagnosis    Hyperlipidemia          Current Outpatient Medications   Medication Sig Dispense Refill    Cholecalciferol (VITAMIN D) 50 MCG (2000 UT) CAPS capsule Take by mouth      simvastatin (ZOCOR) 20 MG tablet TAKE ONE TABLET BY MOUTH ONCE NIGHTLY 90 tablet 2    meloxicam (MOBIC) 7.5 MG tablet Take 1 tablet by mouth daily (Patient taking differently: Take 7.5 mg by mouth as needed ) 30 tablet 1     No current facility-administered medications for this visit. No Known Allergies    Review of Systems   Constitutional: Negative for activity change and fever. Respiratory: Negative for cough and shortness of breath. Cardiovascular: Negative for chest pain. Gastrointestinal: Positive for abdominal distention and abdominal pain. Negative for blood in stool, constipation, diarrhea, nausea and vomiting. Genitourinary: Negative for difficulty urinating, dysuria and frequency. Musculoskeletal: Positive for myalgias.        Vitals:    05/20/22 0918   BP: 124/76   Site: Right Upper Arm   Position: Sitting   Cuff Size: Medium Adult   Pulse: 85   Resp: 14   Temp: 98.6 °F (37 °C)   TempSrc: Oral   SpO2: 98%   Weight: 157 lb 12.8 oz (71.6 kg)   Height: 5' 5\" (1.651 m)       Body mass index is 26.26 kg/m². Wt Readings from Last 3 Encounters:   05/20/22 157 lb 12.8 oz (71.6 kg)   01/17/22 157 lb (71.2 kg)   09/27/21 155 lb (70.3 kg)       BP Readings from Last 3 Encounters:   05/20/22 124/76   09/27/21 120/78   05/03/21 124/66       Physical Exam  Vitals and nursing note reviewed. Constitutional:       General: She is not in acute distress. Appearance: She is well-developed. HENT:      Head: Normocephalic and atraumatic. Cardiovascular:      Rate and Rhythm: Normal rate and regular rhythm. Heart sounds: Normal heart sounds. No murmur heard. No friction rub. No gallop. Pulmonary:      Effort: Pulmonary effort is normal. No respiratory distress. Breath sounds: Normal breath sounds. Abdominal:      General: Bowel sounds are normal.      Palpations: Abdomen is soft. Tenderness: There is no abdominal tenderness. There is no right CVA tenderness, guarding or rebound. Musculoskeletal:      Cervical back: Neck supple. Skin:     General: Skin is warm and dry. Neurological:      Mental Status: She is alert and oriented to person, place, and time. Psychiatric:         Behavior: Behavior normal.         Thought Content: Thought content normal.         Judgment: Judgment normal.         [facility] Amparo Qiu was seen today for abdominal pain. Diagnoses and all orders for this visit:    Abdominal bloating  Symptoms have been resolved for past 2 days. Suspect that she had a GI virus that caused symptoms. Continue to stay hydrated with water and eat healthy diet. She has occasional use of mobic. Discussed that frequent use of NSAID can cause GI complaints. Advised to take medication with food.  Advised to let Dr. Martha Ramirez know if she is needing to take more frequently. Patient is to call with recurrent symptoms    ARTEMIO on CPAP  Reports that she was recently dx with severe sleep apnea and now on cpap nightly and doing well. Asked that she have her records sent here for review. Return in about 4 months (around 9/28/2022), or if symptoms worsen or fail to improve, for AWV, with Dr. Nina Kilgore.

## 2022-08-26 DIAGNOSIS — E78.5 HYPERLIPIDEMIA, UNSPECIFIED HYPERLIPIDEMIA TYPE: Primary | ICD-10-CM

## 2022-08-26 RX ORDER — SIMVASTATIN 20 MG
TABLET ORAL
Qty: 90 TABLET | Refills: 0 | Status: SHIPPED | OUTPATIENT
Start: 2022-08-26 | End: 2022-11-29

## 2022-09-14 ENCOUNTER — OFFICE VISIT (OUTPATIENT)
Dept: FAMILY MEDICINE CLINIC | Age: 72
End: 2022-09-14
Payer: MEDICARE

## 2022-09-14 VITALS
HEART RATE: 53 BPM | RESPIRATION RATE: 16 BRPM | BODY MASS INDEX: 26.33 KG/M2 | DIASTOLIC BLOOD PRESSURE: 78 MMHG | SYSTOLIC BLOOD PRESSURE: 118 MMHG | WEIGHT: 158 LBS | HEIGHT: 65 IN | OXYGEN SATURATION: 96 %

## 2022-09-14 DIAGNOSIS — Z23 FLU VACCINE NEED: ICD-10-CM

## 2022-09-14 DIAGNOSIS — M79.675 GREAT TOE PAIN, LEFT: Primary | ICD-10-CM

## 2022-09-14 PROCEDURE — 1123F ACP DISCUSS/DSCN MKR DOCD: CPT | Performed by: FAMILY MEDICINE

## 2022-09-14 PROCEDURE — 99213 OFFICE O/P EST LOW 20 MIN: CPT | Performed by: FAMILY MEDICINE

## 2022-09-14 PROCEDURE — G0008 ADMIN INFLUENZA VIRUS VAC: HCPCS | Performed by: FAMILY MEDICINE

## 2022-09-14 PROCEDURE — 90694 VACC AIIV4 NO PRSRV 0.5ML IM: CPT | Performed by: FAMILY MEDICINE

## 2022-09-14 NOTE — PROGRESS NOTES
9/14/2022    This is a 71 y.o. female   Chief Complaint   Patient presents with    Toe Pain     This has been going on x 1 week,it's only her left foot and her big toe.  says she walks 4 miles a day. HPI    Here for concerns for toe pain  - going on for about 10 days on the great toe of her left foot. Walks about 4 miles per day typically. Walking does tend to make it worse. Not effecting sleep at night or waking her up. No hx of gout in the past  - switched walking shoes about 4-6 weeks ago  -No known trauma or inciting event    Review of Systems     Current Outpatient Medications   Medication Sig Dispense Refill    simvastatin (ZOCOR) 20 MG tablet TAKE ONE TABLET BY MOUTH ONCE NIGHTLY 90 tablet 0    Cholecalciferol (VITAMIN D) 50 MCG (2000 UT) CAPS capsule Take by mouth      meloxicam (MOBIC) 7.5 MG tablet Take 1 tablet by mouth daily (Patient not taking: Reported on 9/14/2022) 30 tablet 1     No current facility-administered medications for this visit. /78 (Site: Right Upper Arm, Position: Sitting, Cuff Size: Medium Adult)   Pulse 53   Resp 16   Ht 5' 5\" (1.651 m)   Wt 158 lb (71.7 kg)   SpO2 96%   BMI 26.29 kg/m²     Physical Exam  No significant focal tenderness to palpation in the left foot. Mild swelling noted at the base of the great toe and in the medial malleolus. No focal bony tenderness    Wt Readings from Last 3 Encounters:   09/14/22 158 lb (71.7 kg)   05/20/22 157 lb 12.8 oz (71.6 kg)   01/17/22 157 lb (71.2 kg)     BP Readings from Last 3 Encounters:   09/14/22 118/78   05/20/22 124/76   09/27/21 120/78     Assessment/Plan:  1. Great toe pain, left  - XR FOOT LEFT (MIN 3 VIEWS); Future    2. Flu vaccine need  - Influenza, FLUAD, (age 72 y+), IM, Preservative Free, 0.5 mL    History and exam are not consistent with gout. Discussed possibility of possible flareup of pain from changing shoe and footwear.   No large increase in baseline physical activities a less likely stress fracture and there is no focal bony tenderness today. However, if symptoms do not improve with short course of anti-inflammatories would recommend getting x-ray and following up if symptoms not improved.   Encouraged considering change in footwear as this may have been the nidus of increased pain

## 2022-09-30 ENCOUNTER — OFFICE VISIT (OUTPATIENT)
Dept: FAMILY MEDICINE CLINIC | Age: 72
End: 2022-09-30
Payer: MEDICARE

## 2022-09-30 VITALS
HEIGHT: 65 IN | DIASTOLIC BLOOD PRESSURE: 70 MMHG | HEART RATE: 86 BPM | RESPIRATION RATE: 16 BRPM | BODY MASS INDEX: 25.99 KG/M2 | SYSTOLIC BLOOD PRESSURE: 122 MMHG | WEIGHT: 156 LBS | OXYGEN SATURATION: 96 %

## 2022-09-30 DIAGNOSIS — E78.00 HYPERCHOLESTEROLEMIA: ICD-10-CM

## 2022-09-30 DIAGNOSIS — E78.5 HYPERLIPIDEMIA, UNSPECIFIED HYPERLIPIDEMIA TYPE: ICD-10-CM

## 2022-09-30 DIAGNOSIS — G47.33 OSA ON CPAP: ICD-10-CM

## 2022-09-30 DIAGNOSIS — Z00.00 MEDICARE ANNUAL WELLNESS VISIT, SUBSEQUENT: Primary | ICD-10-CM

## 2022-09-30 DIAGNOSIS — Z99.89 OSA ON CPAP: ICD-10-CM

## 2022-09-30 DIAGNOSIS — Z00.00 ENCOUNTER FOR MEDICARE ANNUAL WELLNESS EXAM: ICD-10-CM

## 2022-09-30 PROCEDURE — 1123F ACP DISCUSS/DSCN MKR DOCD: CPT | Performed by: INTERNAL MEDICINE

## 2022-09-30 PROCEDURE — G0439 PPPS, SUBSEQ VISIT: HCPCS | Performed by: INTERNAL MEDICINE

## 2022-09-30 SDOH — ECONOMIC STABILITY: FOOD INSECURITY: WITHIN THE PAST 12 MONTHS, THE FOOD YOU BOUGHT JUST DIDN'T LAST AND YOU DIDN'T HAVE MONEY TO GET MORE.: NEVER TRUE

## 2022-09-30 SDOH — ECONOMIC STABILITY: FOOD INSECURITY: WITHIN THE PAST 12 MONTHS, YOU WORRIED THAT YOUR FOOD WOULD RUN OUT BEFORE YOU GOT MONEY TO BUY MORE.: NEVER TRUE

## 2022-09-30 ASSESSMENT — LIFESTYLE VARIABLES
HOW OFTEN DURING THE LAST YEAR HAVE YOU NEEDED AN ALCOHOLIC DRINK FIRST THING IN THE MORNING TO GET YOURSELF GOING AFTER A NIGHT OF HEAVY DRINKING: 0
HAS A RELATIVE, FRIEND, DOCTOR, OR ANOTHER HEALTH PROFESSIONAL EXPRESSED CONCERN ABOUT YOUR DRINKING OR SUGGESTED YOU CUT DOWN: 0
HOW OFTEN DURING THE LAST YEAR HAVE YOU FOUND THAT YOU WERE NOT ABLE TO STOP DRINKING ONCE YOU HAD STARTED: 0
HAVE YOU OR SOMEONE ELSE BEEN INJURED AS A RESULT OF YOUR DRINKING: 0
HOW MANY STANDARD DRINKS CONTAINING ALCOHOL DO YOU HAVE ON A TYPICAL DAY: 1 OR 2
HOW OFTEN DO YOU HAVE A DRINK CONTAINING ALCOHOL: 4 OR MORE TIMES A WEEK
HOW OFTEN DURING THE LAST YEAR HAVE YOU HAD A FEELING OF GUILT OR REMORSE AFTER DRINKING: 0
HOW OFTEN DURING THE LAST YEAR HAVE YOU FAILED TO DO WHAT WAS NORMALLY EXPECTED FROM YOU BECAUSE OF DRINKING: 0
HOW OFTEN DURING THE LAST YEAR HAVE YOU BEEN UNABLE TO REMEMBER WHAT HAPPENED THE NIGHT BEFORE BECAUSE YOU HAD BEEN DRINKING: 0

## 2022-09-30 ASSESSMENT — PATIENT HEALTH QUESTIONNAIRE - PHQ9
SUM OF ALL RESPONSES TO PHQ9 QUESTIONS 1 & 2: 0
SUM OF ALL RESPONSES TO PHQ QUESTIONS 1-9: 0
2. FEELING DOWN, DEPRESSED OR HOPELESS: 0
1. LITTLE INTEREST OR PLEASURE IN DOING THINGS: 0

## 2022-09-30 ASSESSMENT — SOCIAL DETERMINANTS OF HEALTH (SDOH): HOW HARD IS IT FOR YOU TO PAY FOR THE VERY BASICS LIKE FOOD, HOUSING, MEDICAL CARE, AND HEATING?: NOT HARD AT ALL

## 2022-09-30 NOTE — PATIENT INSTRUCTIONS
1200 mg total of calcium /day    Personalized Preventive Plan for Pamela Doll - 9/30/2022  Medicare offers a range of preventive health benefits. Some of the tests and screenings are paid in full while other may be subject to a deductible, co-insurance, and/or copay. Some of these benefits include a comprehensive review of your medical history including lifestyle, illnesses that may run in your family, and various assessments and screenings as appropriate. After reviewing your medical record and screening and assessments performed today your provider may have ordered immunizations, labs, imaging, and/or referrals for you. A list of these orders (if applicable) as well as your Preventive Care list are included within your After Visit Summary for your review. Other Preventive Recommendations:    A preventive eye exam performed by an eye specialist is recommended every 1-2 years to screen for glaucoma; cataracts, macular degeneration, and other eye disorders. A preventive dental visit is recommended every 6 months. Try to get at least 150 minutes of exercise per week or 10,000 steps per day on a pedometer . Order or download the FREE \"Exercise & Physical Activity: Your Everyday Guide\" from [employer]. Call 7-297.769.6193 or search [employer] online. You need 9195-9587 mg of calcium and 3019-7638 IU of vitamin D per day. It is possible to meet your calcium requirement with diet alone, but a vitamin D supplement is usually necessary to meet this goal.  When exposed to the sun, use a sunscreen that protects against both UVA and UVB radiation with an SPF of 30 or greater. Reapply every 2 to 3 hours or after sweating, drying off with a towel, or swimming. Always wear a seat belt when traveling in a car. Always wear a helmet when riding a bicycle or motorcycle.

## 2022-09-30 NOTE — PROGRESS NOTES
Medicare Annual Wellness Visit    Suze Alvarez is here for Medicare AWV (Patient is here for a medicare AWV)      Patient's complete Health Risk Assessment and screening values have been reviewed and are found in Flowsheets. The following problems were reviewed today and where indicated follow up appointments were made and/or referrals ordered. Positive Risk Factor Screenings with Interventions:                Safety:  Do you have working smoke detectors?: Yes  Do you have any tripping hazards - loose or unsecured carpets or rugs?: No  Do you have any tripping hazards - clutter in doorways, halls, or stairs?: No  Do you have either shower bars, grab bars, non-slip mats or non-slip surfaces in your shower or bathtub?: (!) No  Do all of your stairways have a railing or banister?: Yes  Do you always fasten your seatbelt when you are in a car?: Yes  Safety Interventions:  Has a walk in shower with a seat   Tile floor            Objective   Vitals:    09/30/22 0941   BP: 122/70   Site: Right Upper Arm   Position: Sitting   Cuff Size: Large Adult   Pulse: 86   Resp: 16   SpO2: 96%   Weight: 156 lb (70.8 kg)   Height: 5' 5\" (1.651 m)      Body mass index is 25.96 kg/m². No Known Allergies  Prior to Visit Medications    Medication Sig Taking?  Authorizing Provider   simvastatin (ZOCOR) 20 MG tablet TAKE ONE TABLET BY MOUTH ONCE NIGHTLY Yes Donnie Cruz MD   Cholecalciferol (VITAMIN D) 50 MCG (2000 UT) CAPS capsule Take by mouth Yes Historical Provider, MD   meloxicam (MOBIC) 7.5 MG tablet Take 1 tablet by mouth daily  Patient not taking: No sig reported  MD Marcelo RaoTebenito (Including outside providers/suppliers regularly involved in providing care):   Patient Care Team:  Donnie Cruz MD as PCP - General (Internal Medicine)  Donnie Cruz MD as PCP - REHABILITATION [facility] Empaneled Provider  Jay Vinson MD as Consulting Physician (Ophthalmology)  Andrez Ferrara MD as Consulting

## 2022-10-07 ENCOUNTER — HOSPITAL ENCOUNTER (OUTPATIENT)
Dept: GENERAL RADIOLOGY | Age: 72
Discharge: HOME | End: 2022-10-07
Payer: MEDICARE

## 2022-10-07 ENCOUNTER — HOSPITAL ENCOUNTER (OUTPATIENT)
Age: 72
Discharge: HOME | End: 2022-10-07
Payer: MEDICARE

## 2022-10-07 ENCOUNTER — TELEPHONE (OUTPATIENT)
Dept: FAMILY MEDICINE CLINIC | Age: 72
End: 2022-10-07

## 2022-10-07 DIAGNOSIS — M79.675 GREAT TOE PAIN, LEFT: ICD-10-CM

## 2022-10-07 PROCEDURE — 73630 X-RAY EXAM OF FOOT: CPT

## 2022-11-17 DIAGNOSIS — E78.00 HYPERCHOLESTEROLEMIA: ICD-10-CM

## 2022-11-17 LAB
A/G RATIO: 1.8 (ref 1.1–2.2)
ALBUMIN SERPL-MCNC: 4.4 G/DL (ref 3.4–5)
ALP BLD-CCNC: 70 U/L (ref 40–129)
ALT SERPL-CCNC: 13 U/L (ref 10–40)
ANION GAP SERPL CALCULATED.3IONS-SCNC: 13 MMOL/L (ref 3–16)
AST SERPL-CCNC: 24 U/L (ref 15–37)
BILIRUB SERPL-MCNC: <0.2 MG/DL (ref 0–1)
BUN BLDV-MCNC: 19 MG/DL (ref 7–20)
CALCIUM SERPL-MCNC: 9.7 MG/DL (ref 8.3–10.6)
CHLORIDE BLD-SCNC: 104 MMOL/L (ref 99–110)
CHOLESTEROL, TOTAL: 197 MG/DL (ref 0–199)
CO2: 25 MMOL/L (ref 21–32)
CREAT SERPL-MCNC: 1 MG/DL (ref 0.6–1.2)
GFR SERPL CREATININE-BSD FRML MDRD: >60 ML/MIN/{1.73_M2}
GLUCOSE BLD-MCNC: 106 MG/DL (ref 70–99)
HDLC SERPL-MCNC: 85 MG/DL (ref 40–60)
LDL CHOLESTEROL CALCULATED: 99 MG/DL
POTASSIUM SERPL-SCNC: 4.6 MMOL/L (ref 3.5–5.1)
SODIUM BLD-SCNC: 142 MMOL/L (ref 136–145)
TOTAL PROTEIN: 6.9 G/DL (ref 6.4–8.2)
TRIGL SERPL-MCNC: 67 MG/DL (ref 0–150)
VLDLC SERPL CALC-MCNC: 13 MG/DL

## 2022-11-28 DIAGNOSIS — E78.5 HYPERLIPIDEMIA, UNSPECIFIED HYPERLIPIDEMIA TYPE: ICD-10-CM

## 2022-11-29 RX ORDER — MELOXICAM 7.5 MG/1
TABLET ORAL
Qty: 30 TABLET | Refills: 0 | OUTPATIENT
Start: 2022-11-29

## 2022-11-29 RX ORDER — SIMVASTATIN 20 MG
TABLET ORAL
Qty: 90 TABLET | Refills: 3 | Status: SHIPPED | OUTPATIENT
Start: 2022-11-29

## 2022-12-05 RX ORDER — MELOXICAM 7.5 MG/1
7.5 TABLET ORAL DAILY
Qty: 30 TABLET | Refills: 1 | Status: SHIPPED | OUTPATIENT
Start: 2022-12-05

## 2023-01-17 ENCOUNTER — TELEPHONE (OUTPATIENT)
Dept: FAMILY MEDICINE CLINIC | Age: 73
End: 2023-01-17

## 2023-01-17 NOTE — TELEPHONE ENCOUNTER
----- Message from Dileep Phillips sent at 1/17/2023  3:46 PM EST -----  Subject: Message to Provider    QUESTIONS  Information for Provider? pt has appointment on 1/24/23 for discomfort in   right leg and would like to be put on cancellation list for anything on   thursday or friday this week.  ---------------------------------------------------------------------------  --------------  [id number] [employer]  [phone]; Do not leave any message, patient will call back for answer  ---------------------------------------------------------------------------  --------------  SCRIPT ANSWERS  Relationship to Patient?  Self

## 2023-01-24 ENCOUNTER — OFFICE VISIT (OUTPATIENT)
Dept: FAMILY MEDICINE CLINIC | Age: 73
End: 2023-01-24

## 2023-01-24 VITALS
DIASTOLIC BLOOD PRESSURE: 82 MMHG | HEART RATE: 81 BPM | SYSTOLIC BLOOD PRESSURE: 122 MMHG | OXYGEN SATURATION: 94 % | RESPIRATION RATE: 16 BRPM | WEIGHT: 158 LBS | BODY MASS INDEX: 26.33 KG/M2 | HEIGHT: 65 IN

## 2023-01-24 DIAGNOSIS — M79.604 PAIN OF RIGHT LOWER EXTREMITY: Primary | ICD-10-CM

## 2023-01-24 ASSESSMENT — PATIENT HEALTH QUESTIONNAIRE - PHQ9
2. FEELING DOWN, DEPRESSED OR HOPELESS: 0
1. LITTLE INTEREST OR PLEASURE IN DOING THINGS: 0
SUM OF ALL RESPONSES TO PHQ9 QUESTIONS 1 & 2: 0
SUM OF ALL RESPONSES TO PHQ QUESTIONS 1-9: 0

## 2023-01-24 NOTE — PROGRESS NOTES
Serenity Wellington (:  1950) is a 71 y.o. female, here for evaluation of the following chief complaint(s):  Leg Pain (Patient is here for right leg discomfort that has been going on for 3 weeks )           Assessment/Plan    Brenna Parra was seen today for leg pain. Diagnoses and all orders for this visit:    Pain of right lower extremity     Declined xray and mus relaxer for now  Better for  last  2-3 days   Pain is mostly in the right hip area      Subjective   SUBJECTIVE/OBJECTIVE:  Leg Pain   Pertinent negatives include no numbness. Training to walk a 1/2 marathon.   Walked 2 marathons and number of  1/2 marathons    Walking  3-4 miles a day  Training for flying pig  Getting pain  in the right  lower ext  From about the right hip  to right ankle    Pain is ache  No numbness or tingling  Does not get while walking  Gets it at night  Can wake her up    She wondered about her knee last week , fine this week  Sometimes takes the weekend off so 5 days a week  Trying to do weights also      No results found for: LABA1C, LABMICR    Lab Results   Component Value Date     2022     2021     2020    K 4.6 2022    K 4.2 2021    K 4.3 2020     2022     2021     2020    CO2 25 2022    CO2 25 2021    CO2 23 2020    BUN 19 2022    BUN 15 2021    BUN 18 2020    CREATININE 1.0 2022    CREATININE 0.9 2021    CREATININE 1.07 2020    GLUCOSE 106 (H) 2022    GLUCOSE 94 2021    GLUCOSE 100 2020    CALCIUM 9.7 2022    CALCIUM 9.5 2021    CALCIUM 9.9 2020       Lab Results   Component Value Date    CHOL 197 2022    CHOL 180 2021    CHOL 181 2020    TRIG 67 2022    TRIG 50 2021    TRIG 77 2020    HDL 85 (H) 2022    HDL 83 (H) 2021    HDL 82 (A) 2020    LDLCALC 99 2022    LDLCALC 87 2021 LDLCALC 85 09/04/2020    LDLDIRECT 71 08/27/2013    LDLDIRECT 89 05/09/2012       Lab Results   Component Value Date    ALT 13 11/17/2022    ALT 22 09/27/2021    ALT 22 09/04/2020    AST 24 11/17/2022    AST 25 09/27/2021    AST 26 09/04/2020       Lab Results   Component Value Date    TSH 1.880 09/04/2020    TSH 2.050 09/25/2019    TSH 1.800 06/05/2018       Lab Results   Component Value Date    WBC 4.0 09/04/2020    WBC 4.1 09/25/2019    WBC 4.4 06/05/2018    HGB 13.3 09/04/2020    HGB 13.6 09/25/2019    HGB 13.1 06/05/2018    HCT 38.7 09/04/2020    HCT 38.8 09/25/2019    HCT 40.2 06/05/2018    MCV 90 09/04/2020    MCV 86 09/25/2019    MCV 91 06/05/2018     09/04/2020     09/25/2019     06/05/2018       No results found for: PSA     No results found for: LABURIC     Vitals:    01/24/23 1056   BP: 122/82   Pulse: 81   Resp: 16   SpO2: 94%       BP Readings from Last 3 Encounters:   01/24/23 122/82   09/30/22 122/70   09/14/22 118/78        Wt Readings from Last 3 Encounters:   01/24/23 158 lb (71.7 kg)   09/30/22 156 lb (70.8 kg)   09/14/22 158 lb (71.7 kg)      Eats well  Father MI age 52  He was a smoker    Review of Systems   Neurological:  Negative for numbness. No tingling        Objective   Physical Exam  Constitutional:       Appearance: Normal appearance. She is well-developed. HENT:      Head: Normocephalic and atraumatic. Pulmonary:      Effort: Pulmonary effort is normal.   Musculoskeletal:      Comments: Straight leg raising neg  Sitting knee ext  neg  No pain in the right hip   Skin:     General: Skin is warm and dry. Neurological:      Mental Status: She is alert. Psychiatric:         Mood and Affect: Mood normal.         Behavior: Behavior normal.         Thought Content:  Thought content normal.         Judgment: Judgment normal.          Jack Braga MD

## 2023-02-21 ENCOUNTER — OFFICE VISIT (OUTPATIENT)
Dept: ORTHOPEDIC SURGERY | Age: 73
End: 2023-02-21

## 2023-02-21 VITALS — HEIGHT: 65 IN | WEIGHT: 156 LBS | BODY MASS INDEX: 25.99 KG/M2

## 2023-02-21 DIAGNOSIS — M17.11 PRIMARY OSTEOARTHRITIS OF RIGHT KNEE: ICD-10-CM

## 2023-02-21 DIAGNOSIS — M79.604 PAIN OF RIGHT LOWER EXTREMITY: ICD-10-CM

## 2023-02-21 DIAGNOSIS — M25.561 RIGHT KNEE PAIN, UNSPECIFIED CHRONICITY: Primary | ICD-10-CM

## 2023-02-21 DIAGNOSIS — M51.36 DDD (DEGENERATIVE DISC DISEASE), LUMBAR: ICD-10-CM

## 2023-02-21 PROBLEM — M51.369 DDD (DEGENERATIVE DISC DISEASE), LUMBAR: Status: ACTIVE | Noted: 2023-02-21

## 2023-02-21 RX ORDER — METHYLPREDNISOLONE 4 MG/1
TABLET ORAL
Qty: 21 KIT | Refills: 0 | Status: SHIPPED | OUTPATIENT
Start: 2023-02-21

## 2023-02-21 NOTE — PROGRESS NOTES
Chief Complaint  Leg Pain (RT Leg Pain)      Initial consultation new onset right leg and knee primarily nocturnal pain    History of Present Illness: Lory Villalobos is a 71 y.o. 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 Marlon Aguilera who is being seen today upon self-referral for evaluation of newer onset of pain and achiness worse at night to her right leg since early February 2023. There is no history of injury fall or trauma prior to becoming symptomatic nor was there any change in her workouts and states that in early February 2023 she began to notice an aching discomfort to her right leg but is progressed to being very painful and primarily only at night into her right leg. It seems to be more in the L4-5 distribution although she has only minimal back discomfort. She reports no groin pain is noticed some crepitation and popping involving both knees. She has not had a great deal of swelling involving the knees and not really associated with numbness or tingling. She is having a difficult time sleeping although her symptoms may have eased up somewhat over the past several days it was quite symptomatic last week with pain going up to about a 7 out of 10. She definitely denies neurogenic bowel or bladder symptoms and does have a history of cervical disc/spondylolysis recently evaluated by Dr. Theodore Stuart. She really has not taken much in the way of medications and is being seen today for orthopedic and sports consultation with initial imaging. Pain Assessment  Location of Pain: Leg  Location Modifiers: Right  Severity of Pain: 7  Quality of Pain: Aching  Duration of Pain: Persistent  Frequency of Pain: Intermittent  Date Pain First Started:  (ONset weeks)  Aggravating Factors:  Other (Comment) (wakes her at night)  Limiting Behavior: No  Result of Injury: No  Work-Related Injury: No  Are there other pain locations you wish to document?: No         Medical History     Patient's medications, allergies, past medical, surgical, social and family histories were reviewed and updated as appropriate. Review of Systems  Pertinent items are noted in HPI  Review of systems reviewed from Patient History Form dated on 2/21/2023 and available in the patient's chart under the Media tab. Vital Signs  There were no vitals filed for this visit. General Exam:     Constitutional: Patient is adequately groomed with no evidence of malnutrition  DTRs: Deep tendon reflexes are intact  Mental Status: The patient is oriented to time, place and person. The patient's mood and affect are appropriate. Lymphatic: The lymphatic examination bilaterally reveals all areas to be without enlargement or induration. Vascular: Examination reveals no swelling or calf tenderness. Peripheral pulses are palpable and 2+. Neurological: The patient has good coordination. There is no weakness or sensory deficit. Knee Examination  Inspection: There is no high-grade deformity or substantial soft tissue swelling. She does have patellofemoral crepitation. Palpation: Does have some tenderness over the medial and lateral patellofemoral facet and does appear to have a medial shelf plica. This does not really reproduce the same symptoms that she except experiencing at night and is more of a soreness. She does not exhibit joint line tenderness of substance. Rang of Motion: Does have full active and passive range of motion with some tightness to her hamstrings. Strength: 4+ to 5- out of 5 with knee flexion extension. Special Tests: Does have mild pain reproduced with patellar grind testing but this does not appear to be similar to what she has been experiencing at night into her right leg recently.   No substantial joint line tenderness. No evidence of instability. Negative apprehension testing. Negative Adilson's. Negative screening hip testing. Skin: There are no rashes, ulcerations or lesions. Distal neurovascular exam is intact. Gait: Fluid smooth gait    Reflex symmetrically preserved    Additional Comments: Examination of her lumbar spine does reveal some very minimal lower lumbar facet mild discomfort with some central gluteal tenderness. She does have full active and passive range of motion involving the back with some mild limitations in extension but facet loading and straight leg raising bilaterally appears to be fairly unremarkable. No evidence of focal lower extremity motor deficits with symmetric lower extremity DTRs. Additional Examinations:  Contralateral Exam: Examination of the left knee reveals intact skin. There is no focal tenderness. The patient demonstrates full painless range of motion with regards to flexion and extension. Strength is 5/5 thorough out all planes. Ligamentous stability is grossly intact. Right Lower Extremity: Examination of the right lower extremity does not show any tenderness, deformity or injury. Range of motion is unremarkable. There is no gross instability. There are no rashes, ulcerations or lesions. Strength and tone are normal.  Left Lower Extremity: Examination of the left lower extremity does not show any tenderness, deformity or injury. Range of motion is unremarkable. There is no gross instability. There are no rashes, ulcerations or lesions. Strength and tone are normal.      Diagnostic Test Findings: Right knee AP and PA weightbearing sunrise and lateral films were obtained today and does show mild medial compartment narrowing with mild patellofemoral tilt and arthropathy. AP and lateral lumbar spine films were obtained today and does show slight loss of lumbar lordosis.   She does appear to have some lower facet arthropathy and mild lower level lumbar degenerative disc changes    Assessment : #1.  3 weeks status post newer onset right leg pain with history of mild right knee osteoarthritis with patellofemoral arthropathy and underlying lumbar degenerative disc disease with primarily nocturnal right leg pain    Impression:  Encounter Diagnoses   Name Primary?    Right knee pain, unspecified chronicity Yes    Pain of right lower extremity     DDD (degenerative disc disease), lumbar     Primary osteoarthritis of right knee        Office Procedures:  Orders Placed This Encounter   Procedures    XR KNEE RIGHT (MIN 4 VIEWS)     4V R Knee     Standing Status:   Future     Number of Occurrences:   1     Standing Expiration Date:   3/21/2023     Order Specific Question:   Reason for exam:     Answer:   Knee Pain    XR LUMBAR SPINE (2-3 VIEWS)     2V Lumbar     Standing Status:   Future     Number of Occurrences:   1     Standing Expiration Date:   3/21/2023     Order Specific Question:   Reason for exam:     Answer:   LBP       Treatment Plan:  Treatment options were discussed with Uyen Ramos.  We did review her plain films and exam findings.  While she does have some underlying mild arthritic changes to her right knee and does appear to have a plica her major complaint is more of nocturnal more diffuse right leg pain which could very well be related to a radiculopathy.  This been ongoing since early February 2023 and there is no history of fall or trauma.  We did discuss further work-up with imaging however she has had little in the way of treatment.  Following discussion of options, we did place her on Medrol Dosepak to be followed by having her try meloxicam 15 mg daily for the next several weeks.  She does have meloxicam at home.  We instructed her on patella protection program as well as a gentle core strengthening program may continue with yoga based on pain and we will see her back in a few weeks for follow-up.  We may consider imaging of her  lumbar spine and he should remain symptomatic and formal therapy. She will contact us in the interim with questions or concerns. This dictation was performed with a verbal recognition program (DRAGON) and it was checked for errors. It is possible that there are still dictated errors within this office note. If so, please bring any errors to my attention for an addendum. All efforts were made to ensure that this office note is accurate.

## 2023-02-22 ENCOUNTER — HOSPITAL ENCOUNTER (OUTPATIENT)
Dept: MAMMOGRAPHY | Age: 73
Discharge: HOME | End: 2023-02-22
Payer: MEDICARE

## 2023-02-22 DIAGNOSIS — Z12.31 VISIT FOR SCREENING MAMMOGRAM: ICD-10-CM

## 2023-02-22 PROCEDURE — 77063 BREAST TOMOSYNTHESIS BI: CPT

## 2023-03-14 ENCOUNTER — OFFICE VISIT (OUTPATIENT)
Dept: ORTHOPEDIC SURGERY | Age: 73
End: 2023-03-14
Payer: MEDICARE

## 2023-03-14 DIAGNOSIS — M17.11 PRIMARY OSTEOARTHRITIS OF RIGHT KNEE: ICD-10-CM

## 2023-03-14 DIAGNOSIS — M51.36 DDD (DEGENERATIVE DISC DISEASE), LUMBAR: ICD-10-CM

## 2023-03-14 DIAGNOSIS — M25.561 RIGHT KNEE PAIN, UNSPECIFIED CHRONICITY: Primary | ICD-10-CM

## 2023-03-14 DIAGNOSIS — M79.604 PAIN OF RIGHT LOWER EXTREMITY: ICD-10-CM

## 2023-03-14 PROCEDURE — 99213 OFFICE O/P EST LOW 20 MIN: CPT | Performed by: FAMILY MEDICINE

## 2023-03-14 PROCEDURE — 1123F ACP DISCUSS/DSCN MKR DOCD: CPT | Performed by: FAMILY MEDICINE

## 2023-03-14 NOTE — PROGRESS NOTES
Chief Complaint  Knee Pain (3 WK FU R KNEE )      FU new onset right leg and knee primarily nocturnal pain. Patient found to have only mild left knee osteoarthritis with patellofemoral arthropathy with possible on-call radiculopathy    History of Present Illness: Arthur Kanner is a 71 y.o. 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 Zamzam Live who is being seen today upon self-referral for evaluation of newer onset of pain and achiness worse at night to her right leg since early February 2023. There is no history of injury fall or trauma prior to becoming symptomatic nor was there any change in her workouts and states that in early February 2023 she began to notice an aching discomfort to her right leg but is progressed to being very painful and primarily only at night into her right leg. It seems to be more in the L4-5 distribution although she has only minimal back discomfort. She reports no groin pain is noticed some crepitation and popping involving both knees. She has not had a great deal of swelling involving the knees and not really associated with numbness or tingling. She is having a difficult time sleeping although her symptoms may have eased up somewhat over the past several days it was quite symptomatic last week with pain going up to about a 7 out of 10. She definitely denies neurogenic bowel or bladder symptoms and does have a history of cervical disc/spondylolysis recently evaluated by Dr. Bernie Tavares. She really has not taken much in the way of medications and is being seen today for orthopedic and sports consultation with initial imaging. We initially saw Meredith Kam in the office on 2/21/2023 for newer onset right leg and knee pain with nocturnal pain.   We did feel at that time that while she did have some underlying knee osteoarthritis there is certainly a strong possibility that this could be representative of a low-grade right-sided L4-5 radiculopathy despite the fact that she had only minimal back discomfort. With initial treatment over the last few weeks, she has been doing outstanding and believes she is back to her baseline with 100% improvement. She is been working her exercise and stretching program is been continuing with her yoga and working on her core strengthening and back exercises which were shown to her last visit. She did finish her Medrol Dosepak and was able to take her meloxicam for couple weeks and has since discontinued this. She actually walked 3-1/2 miles already today without worsening of her pain symptoms no longer having pain at night. Denies mechanical locking or catching or swelling involving the knee and is very pleased with her progress. Medical History     Patient's medications, allergies, past medical, surgical, social and family histories were reviewed and updated as appropriate. Review of Systems  Pertinent items are noted in HPI  Review of systems reviewed from Patient History Form dated on 2/21/2023 and available in the patient's chart under the Media tab. Vital Signs  There were no vitals filed for this visit. General Exam:     Constitutional: Patient is adequately groomed with no evidence of malnutrition  DTRs: Deep tendon reflexes are intact  Mental Status: The patient is oriented to time, place and person. The patient's mood and affect are appropriate. Lymphatic: The lymphatic examination bilaterally reveals all areas to be without enlargement or induration. Vascular: Examination reveals no swelling or calf tenderness. Peripheral pulses are palpable and 2+. Neurological: The patient has good coordination. There is no weakness or sensory deficit.     Knee Examination  Inspection: There is no high-grade deformity or substantial soft tissue swelling. She does have patellofemoral crepitation. Palpation: She no longer has any tenderness over the medial and lateral patellofemoral facet and does appear to have a medial shelf plica. This does not really reproduce the same symptoms that she except experiencing at night and is more of a soreness. She does not exhibit joint line tenderness of substance. Rang of Motion: Does have full active and passive range of motion with some tightness to her hamstrings. Strength: 4+ to 5- out of 5 with knee flexion extension. Special Tests: He no longer has any reproducible pain reproduced with patellar grind testing but this does not appear to be similar to what she has been experiencing at night into her right leg recently. No substantial joint line tenderness. No evidence of instability. Negative apprehension testing. Negative Adilson's. Negative screening hip testing. Skin: There are no rashes, ulcerations or lesions. Distal neurovascular exam is intact. Gait: Fluid smooth gait    Reflex symmetrically preserved    Additional Comments: Examination of her lumbar spine does reveal resolved lower lumbar facet mild discomfort without further central gluteal tenderness. She does have full active and passive range of motion involving the back without further limitations in extension but facet loading and straight leg raising bilaterally appears to be fairly unremarkable. No evidence of focal lower extremity motor deficits with symmetric lower extremity DTRs. Additional Examinations:  Contralateral Exam: Examination of the left knee reveals intact skin. There is no focal tenderness. The patient demonstrates full painless range of motion with regards to flexion and extension. Strength is 5/5 thorough out all planes. Ligamentous stability is grossly intact.     Right Lower Extremity: Examination of the right lower extremity does not show any tenderness, deformity or injury. Range of motion is unremarkable. There is no gross instability. There are no rashes, ulcerations or lesions. Strength and tone are normal.  Left Lower Extremity: Examination of the left lower extremity does not show any tenderness, deformity or injury. Range of motion is unremarkable. There is no gross instability. There are no rashes, ulcerations or lesions. Strength and tone are normal.      Diagnostic Test Findings: Right knee AP and PA weightbearing sunrise and lateral films were reviewed from 2/21/2023 and does show mild medial compartment narrowing with mild patellofemoral tilt and arthropathy. AP and lateral lumbar spine films were o reviewed from 2/21/2020 and does show slight loss of lumbar lordosis. She does appear to have some lower facet arthropathy and mild lower level lumbar degenerative disc changes    Assessment : #1.  6 weeks status post resolved newer onset right leg pain with history of mild right knee osteoarthritis with patellofemoral arthropathy and underlying lumbar degenerative disc disease with primarily nocturnal right leg pain    Impression:  Encounter Diagnoses   Name Primary? Right knee pain, unspecified chronicity Yes    Pain of right lower extremity     DDD (degenerative disc disease), lumbar     Primary osteoarthritis of right knee          Office Procedures:  No orders of the defined types were placed in this encounter. Treatment Plan:  Treatment options were discussed with Adriane Lopes. We did review her plain films and exam findings. While she does have some underlying mild arthritic changes to her right knee and does appear to have a plica her major complaint is more of nocturnal more diffuse right leg pain which could very well have been related to a low-grade right L4-5 radiculopathy. This been ongoing since early February 2023 and there is no history of fall or trauma.   Clinically at this point she is doing outstanding and has had 100% improvement and is back to her baseline. She has been able to walk consistently in preparation for the flying pig walking marathon. She really is not requiring her meloxicam at this point we will continue with her core strengthening and stretching program as well as her yoga and her knee exercises. As long as she does well I think we can see her back as needed but should she have recurrence of her pain I did advise that we will likely need to work her up primarily for an occult radiculopathy with imaging and consider formal therapy. She will contact us in the interim with questions or concerns or symptom recurrence         This dictation was performed with a verbal recognition program (DRAGON) and it was checked for errors. It is possible that there are still dictated errors within this office note. If so, please bring any errors to my attention for an addendum. All efforts were made to ensure that this office note is accurate.

## 2023-06-20 ENCOUNTER — OFFICE VISIT (OUTPATIENT)
Dept: ORTHOPEDIC SURGERY | Age: 73
End: 2023-06-20
Payer: MEDICARE

## 2023-06-20 VITALS — HEIGHT: 65 IN | BODY MASS INDEX: 25.99 KG/M2 | WEIGHT: 156 LBS

## 2023-06-20 DIAGNOSIS — M25.561 RIGHT KNEE PAIN, UNSPECIFIED CHRONICITY: Primary | ICD-10-CM

## 2023-06-20 DIAGNOSIS — M17.11 PRIMARY OSTEOARTHRITIS OF RIGHT KNEE: ICD-10-CM

## 2023-06-20 DIAGNOSIS — M51.36 DDD (DEGENERATIVE DISC DISEASE), LUMBAR: ICD-10-CM

## 2023-06-20 DIAGNOSIS — M79.604 PAIN OF RIGHT LOWER EXTREMITY: ICD-10-CM

## 2023-06-20 PROCEDURE — 99213 OFFICE O/P EST LOW 20 MIN: CPT | Performed by: FAMILY MEDICINE

## 2023-06-20 PROCEDURE — 1123F ACP DISCUSS/DSCN MKR DOCD: CPT | Performed by: FAMILY MEDICINE

## 2023-06-20 NOTE — PROGRESS NOTES
Chief Complaint  Follow-up (R Knee)        FU new onset right leg and knee primarily nocturnal pain. Patient found to have only mild left knee osteoarthritis with patellofemoral arthropathy with possible occult right nocturnal radiculopathy    History of Present Illness: Tico Crisostomo is a 71 y.o. 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 Rachel Lainez who is being seen today upon self-referral for evaluation of newer onset of pain and achiness worse at night to her right leg since early February 2023. There is no history of injury fall or trauma prior to becoming symptomatic nor was there any change in her workouts and states that in early February 2023 she began to notice an aching discomfort to her right leg but is progressed to being very painful and primarily only at night into her right leg. It seems to be more in the L4-5 distribution although she has only minimal back discomfort. She reports no groin pain is noticed some crepitation and popping involving both knees. She has not had a great deal of swelling involving the knees and not really associated with numbness or tingling. She is having a difficult time sleeping although her symptoms may have eased up somewhat over the past several days it was quite symptomatic last week with pain going up to about a 7 out of 10. She definitely denies neurogenic bowel or bladder symptoms and does have a history of cervical disc/spondylolysis recently evaluated by Dr. Gina Wylie. She really has not taken much in the way of medications and is being seen today for orthopedic and sports consultation with initial imaging. We initially saw Kalin Cooley in the office on 2/21/2023 for newer onset right leg and knee pain with nocturnal pain.   We did

## 2023-07-20 ENCOUNTER — OFFICE VISIT (OUTPATIENT)
Dept: ORTHOPEDIC SURGERY | Age: 73
End: 2023-07-20
Payer: MEDICARE

## 2023-07-20 VITALS — HEIGHT: 65 IN | WEIGHT: 156 LBS | BODY MASS INDEX: 25.99 KG/M2

## 2023-07-20 DIAGNOSIS — M51.36 DDD (DEGENERATIVE DISC DISEASE), LUMBAR: ICD-10-CM

## 2023-07-20 DIAGNOSIS — M17.11 PRIMARY OSTEOARTHRITIS OF RIGHT KNEE: ICD-10-CM

## 2023-07-20 DIAGNOSIS — M25.561 RIGHT KNEE PAIN, UNSPECIFIED CHRONICITY: Primary | ICD-10-CM

## 2023-07-20 DIAGNOSIS — M79.604 PAIN OF RIGHT LOWER EXTREMITY: ICD-10-CM

## 2023-07-20 PROCEDURE — 1123F ACP DISCUSS/DSCN MKR DOCD: CPT | Performed by: FAMILY MEDICINE

## 2023-07-20 PROCEDURE — 99213 OFFICE O/P EST LOW 20 MIN: CPT | Performed by: FAMILY MEDICINE

## 2023-07-20 NOTE — PROGRESS NOTES
Chief Complaint  Knee Pain (1 mo fu r knee)        FU new onset right leg and knee primarily nocturnal pain. Patient found to have only mild left knee osteoarthritis with patellofemoral arthropathy with possible occult right nocturnal radiculopathy    History of Present Illness: Jose Cabrera is a 71 y.o. 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 Macy Chaney who is being seen today upon self-referral for evaluation of newer onset of pain and achiness worse at night to her right leg since early February 2023. There is no history of injury fall or trauma prior to becoming symptomatic nor was there any change in her workouts and states that in early February 2023 she began to notice an aching discomfort to her right leg but is progressed to being very painful and primarily only at night into her right leg. It seems to be more in the L4-5 distribution although she has only minimal back discomfort. She reports no groin pain is noticed some crepitation and popping involving both knees. She has not had a great deal of swelling involving the knees and not really associated with numbness or tingling. She is having a difficult time sleeping although her symptoms may have eased up somewhat over the past several days it was quite symptomatic last week with pain going up to about a 7 out of 10. She definitely denies neurogenic bowel or bladder symptoms and does have a history of cervical disc/spondylolysis recently evaluated by Dr. Ricardo Murillo. She really has not taken much in the way of medications and is being seen today for orthopedic and sports consultation with initial imaging. We initially saw Navjot Bates in the office on 2/21/2023 for newer onset right leg and knee pain with nocturnal pain.

## 2023-08-15 DIAGNOSIS — Z01.419 WELL WOMAN EXAM WITH ROUTINE GYNECOLOGICAL EXAM: Primary | ICD-10-CM

## 2023-10-01 SDOH — ECONOMIC STABILITY: INCOME INSECURITY: HOW HARD IS IT FOR YOU TO PAY FOR THE VERY BASICS LIKE FOOD, HOUSING, MEDICAL CARE, AND HEATING?: NOT HARD AT ALL

## 2023-10-01 SDOH — HEALTH STABILITY: PHYSICAL HEALTH: ON AVERAGE, HOW MANY DAYS PER WEEK DO YOU ENGAGE IN MODERATE TO STRENUOUS EXERCISE (LIKE A BRISK WALK)?: 5 DAYS

## 2023-10-01 SDOH — ECONOMIC STABILITY: FOOD INSECURITY: WITHIN THE PAST 12 MONTHS, THE FOOD YOU BOUGHT JUST DIDN'T LAST AND YOU DIDN'T HAVE MONEY TO GET MORE.: NEVER TRUE

## 2023-10-01 SDOH — ECONOMIC STABILITY: FOOD INSECURITY: WITHIN THE PAST 12 MONTHS, YOU WORRIED THAT YOUR FOOD WOULD RUN OUT BEFORE YOU GOT MONEY TO BUY MORE.: NEVER TRUE

## 2023-10-01 SDOH — HEALTH STABILITY: PHYSICAL HEALTH: ON AVERAGE, HOW MANY MINUTES DO YOU ENGAGE IN EXERCISE AT THIS LEVEL?: 60 MIN

## 2023-10-01 SDOH — ECONOMIC STABILITY: HOUSING INSECURITY
IN THE LAST 12 MONTHS, WAS THERE A TIME WHEN YOU DID NOT HAVE A STEADY PLACE TO SLEEP OR SLEPT IN A SHELTER (INCLUDING NOW)?: NO

## 2023-10-01 SDOH — ECONOMIC STABILITY: TRANSPORTATION INSECURITY
IN THE PAST 12 MONTHS, HAS LACK OF TRANSPORTATION KEPT YOU FROM MEETINGS, WORK, OR FROM GETTING THINGS NEEDED FOR DAILY LIVING?: NO

## 2023-10-01 ASSESSMENT — PATIENT HEALTH QUESTIONNAIRE - PHQ9
2. FEELING DOWN, DEPRESSED OR HOPELESS: 0
SUM OF ALL RESPONSES TO PHQ QUESTIONS 1-9: 0
1. LITTLE INTEREST OR PLEASURE IN DOING THINGS: 0
SUM OF ALL RESPONSES TO PHQ9 QUESTIONS 1 & 2: 0
SUM OF ALL RESPONSES TO PHQ QUESTIONS 1-9: 0

## 2023-10-01 ASSESSMENT — LIFESTYLE VARIABLES
HOW MANY STANDARD DRINKS CONTAINING ALCOHOL DO YOU HAVE ON A TYPICAL DAY: 1 OR 2
HOW OFTEN DO YOU HAVE A DRINK CONTAINING ALCOHOL: 4
HOW OFTEN DO YOU HAVE SIX OR MORE DRINKS ON ONE OCCASION: 1
HOW MANY STANDARD DRINKS CONTAINING ALCOHOL DO YOU HAVE ON A TYPICAL DAY: 1
HOW OFTEN DO YOU HAVE A DRINK CONTAINING ALCOHOL: 2-3 TIMES A WEEK

## 2023-10-04 ENCOUNTER — OFFICE VISIT (OUTPATIENT)
Dept: FAMILY MEDICINE CLINIC | Age: 73
End: 2023-10-04

## 2023-10-04 VITALS
BODY MASS INDEX: 26.16 KG/M2 | OXYGEN SATURATION: 96 % | WEIGHT: 157 LBS | RESPIRATION RATE: 16 BRPM | HEIGHT: 65 IN | SYSTOLIC BLOOD PRESSURE: 146 MMHG | HEART RATE: 76 BPM | DIASTOLIC BLOOD PRESSURE: 82 MMHG

## 2023-10-04 DIAGNOSIS — E78.5 HYPERLIPIDEMIA, UNSPECIFIED HYPERLIPIDEMIA TYPE: ICD-10-CM

## 2023-10-04 DIAGNOSIS — G47.33 OSA ON CPAP: ICD-10-CM

## 2023-10-04 DIAGNOSIS — Z00.00 MEDICARE ANNUAL WELLNESS VISIT, SUBSEQUENT: Primary | ICD-10-CM

## 2023-10-04 DIAGNOSIS — R03.0 ELEVATED BP WITHOUT DIAGNOSIS OF HYPERTENSION: ICD-10-CM

## 2023-10-18 DIAGNOSIS — R73.9 HYPERGLYCEMIA: ICD-10-CM

## 2023-10-18 DIAGNOSIS — R94.4 DECREASED GFR: Primary | ICD-10-CM

## 2023-11-03 NOTE — TELEPHONE ENCOUNTER
I like OTC Robitussin DM. She can always make an appointment. What to expect when you have contrast    During your exam, we will inject  contrast  into your vein or artery. (Contrast is a clear liquid with iodine in it. It shows up on X-rays.)    You may feel warm or hot. You may have a metal taste in your mouth and a slight upset stomach. You may also feel pressure near the kidneys and bladder. These effects will last about 1 to 3 minutes.    Please tell us if you have:   Sneezing    Itching   Hives    Swelling in the face   A hoarse voice   Breathing problems   Other new symptoms    Serious problems are rare.  They may include:   Irregular heartbeat    Seizures   Kidney failure             Tissue damage   Shock     Death    If you have any problems during the exam, we  will treat them right away.    When you get home    Call your hospital if you have any new symptoms in the next 2 days, like hives or swelling. (Phone numbers are at the bottom of this page.) Or call your family doctor.     If you have wheezing or trouble breathing, call 911.    Self-care  -Drink at least 4 extra glasses of water today.   This reduces the stress on your kidneys.  -Keep taking your regular medicines.    The contrast will pass out of your body in your  Urine(pee). This will happen in the next 24 hours. You  will not feel this. Your urine will not  change color.    If you have kidney problems or take metformin    Drink 4 to 8 large glasses of water for the next  2 days, if you are not on a fluid restriction.    ?If you take metformin (Glucophage or Glucovance) for diabetes, keep taking it.      ?Your kidney function tests are abnormal.  If you take Metformin, do not take it for 48 hours. Please go to your clinic for a blood test within 3 days after your exam before the restarting this medicine.     (Note to provider:please give patient prescription for lab tests.)    ?Special instructions: ***    I have read and understand the above information.    Patient Sign  Here:______________________________________Date:________Time:______    Staff Sign Here:________________________________________Date:_______Time:______      Radiology Departments:     ?[facility]: [phone] ?[facility]: [phone]     ?[facility]: [phone] ?[place]:[phone]      ?[place]: [phone]  ?[place]: [phone]  ?[place]:[phone]    ?[facility]:[phone]  ?[facility]:[phone]

## 2023-11-22 NOTE — ADDENDUM NOTE
Addended by: Andre Smart on: 10/18/2017 05:10 PM     Modules accepted: Orders
Spoke to patient's daughter Frida who confirms FULL CODE status

## 2023-12-04 ENCOUNTER — HOSPITAL ENCOUNTER (OUTPATIENT)
Dept: ONCOLOGY | Age: 73
Setting detail: INFUSION SERIES
Discharge: HOME | End: 2023-12-04
Payer: MEDICARE

## 2023-12-04 DIAGNOSIS — R94.4 DECREASED GFR: ICD-10-CM

## 2023-12-04 DIAGNOSIS — R73.9 HYPERGLYCEMIA: ICD-10-CM

## 2023-12-04 LAB
ALBUMIN SERPL-MCNC: 4.4 G/DL (ref 3.4–5)
ANION GAP SERPL CALCULATED.3IONS-SCNC: 7 MMOL/L (ref 3–16)
BUN SERPL-MCNC: 22 MG/DL (ref 7–20)
CALCIUM SERPL-MCNC: 9.4 MG/DL (ref 8.3–10.6)
CHLORIDE SERPL-SCNC: 105 MMOL/L (ref 99–110)
CO2 SERPL-SCNC: 28 MMOL/L (ref 21–32)
CREAT SERPL-MCNC: 0.9 MG/DL (ref 0.6–1.2)
GFR SERPLBLD CREATININE-BSD FMLA CKD-EPI: >60 ML/MIN/{1.73_M2}
GLUCOSE SERPL-MCNC: 96 MG/DL (ref 70–99)
PHOSPHATE SERPL-MCNC: 3.2 MG/DL (ref 2.5–4.9)
POTASSIUM SERPL-SCNC: 4.3 MMOL/L (ref 3.5–5.1)
SODIUM SERPL-SCNC: 140 MMOL/L (ref 136–145)

## 2023-12-04 PROCEDURE — 80069 RENAL FUNCTION PANEL: CPT

## 2023-12-04 PROCEDURE — 83036 HEMOGLOBIN GLYCOSYLATED A1C: CPT

## 2023-12-05 LAB
EST. AVERAGE GLUCOSE BLD GHB EST-MCNC: 105.4 MG/DL
HBA1C MFR BLD: 5.3 %

## 2023-12-12 DIAGNOSIS — E78.5 HYPERLIPIDEMIA, UNSPECIFIED HYPERLIPIDEMIA TYPE: ICD-10-CM

## 2023-12-12 RX ORDER — SIMVASTATIN 20 MG
TABLET ORAL
Qty: 90 TABLET | Refills: 3 | Status: SHIPPED | OUTPATIENT
Start: 2023-12-12

## 2024-02-26 ENCOUNTER — HOSPITAL ENCOUNTER (OUTPATIENT)
Dept: MAMMOGRAPHY | Age: 74
Discharge: HOME | End: 2024-02-26
Payer: MEDICARE

## 2024-02-26 VITALS — HEIGHT: 65 IN | BODY MASS INDEX: 26.16 KG/M2 | WEIGHT: 157 LBS

## 2024-02-26 DIAGNOSIS — Z12.31 VISIT FOR SCREENING MAMMOGRAM: ICD-10-CM

## 2024-02-26 PROCEDURE — 77063 BREAST TOMOSYNTHESIS BI: CPT

## 2024-04-11 ENCOUNTER — OFFICE VISIT (OUTPATIENT)
Dept: FAMILY MEDICINE CLINIC | Age: 74
End: 2024-04-11
Payer: MEDICARE

## 2024-04-11 VITALS
OXYGEN SATURATION: 95 % | HEIGHT: 65 IN | BODY MASS INDEX: 26.79 KG/M2 | TEMPERATURE: 98.1 F | RESPIRATION RATE: 18 BRPM | WEIGHT: 160.8 LBS | SYSTOLIC BLOOD PRESSURE: 116 MMHG | HEART RATE: 88 BPM | DIASTOLIC BLOOD PRESSURE: 72 MMHG

## 2024-04-11 DIAGNOSIS — R07.9 CHEST PAIN, UNSPECIFIED TYPE: Primary | ICD-10-CM

## 2024-04-11 DIAGNOSIS — R07.9 CHEST PAIN, UNSPECIFIED TYPE: ICD-10-CM

## 2024-04-11 DIAGNOSIS — E78.00 HYPERCHOLESTEROLEMIA: ICD-10-CM

## 2024-04-11 LAB
ALBUMIN SERPL-MCNC: 4.5 G/DL (ref 3.4–5)
ALBUMIN/GLOB SERPL: 2 {RATIO} (ref 1.1–2.2)
ALP SERPL-CCNC: 84 U/L (ref 40–129)
ALT SERPL-CCNC: 18 U/L (ref 10–40)
ANION GAP SERPL CALCULATED.3IONS-SCNC: 11 MMOL/L (ref 3–16)
AST SERPL-CCNC: 26 U/L (ref 15–37)
BASOPHILS # BLD: 0.1 K/UL (ref 0–0.2)
BASOPHILS NFR BLD: 1.2 %
BILIRUB SERPL-MCNC: 0.3 MG/DL (ref 0–1)
BUN SERPL-MCNC: 23 MG/DL (ref 7–20)
CALCIUM SERPL-MCNC: 9.9 MG/DL (ref 8.3–10.6)
CHLORIDE SERPL-SCNC: 104 MMOL/L (ref 99–110)
CO2 SERPL-SCNC: 26 MMOL/L (ref 21–32)
CREAT SERPL-MCNC: 1.1 MG/DL (ref 0.6–1.2)
DEPRECATED RDW RBC AUTO: 13 % (ref 12.4–15.4)
EOSINOPHIL # BLD: 0.1 K/UL (ref 0–0.6)
EOSINOPHIL NFR BLD: 2.4 %
GFR SERPLBLD CREATININE-BSD FMLA CKD-EPI: 53 ML/MIN/{1.73_M2}
GLUCOSE SERPL-MCNC: 103 MG/DL (ref 70–99)
HCT VFR BLD AUTO: 37.1 % (ref 36–48)
HGB BLD-MCNC: 12.6 G/DL (ref 12–16)
LYMPHOCYTES # BLD: 1.3 K/UL (ref 1–5.1)
LYMPHOCYTES NFR BLD: 23.9 %
MCH RBC QN AUTO: 30.7 PG (ref 26–34)
MCHC RBC AUTO-ENTMCNC: 33.9 G/DL (ref 31–36)
MCV RBC AUTO: 90.4 FL (ref 80–100)
MONOCYTES # BLD: 0.3 K/UL (ref 0–1.3)
MONOCYTES NFR BLD: 5.6 %
NEUTROPHILS # BLD: 3.7 K/UL (ref 1.7–7.7)
NEUTROPHILS NFR BLD: 66.9 %
PLATELET # BLD AUTO: 198 K/UL (ref 135–450)
PMV BLD AUTO: 9 FL (ref 5–10.5)
POTASSIUM SERPL-SCNC: 4.1 MMOL/L (ref 3.5–5.1)
PROT SERPL-MCNC: 6.8 G/DL (ref 6.4–8.2)
RBC # BLD AUTO: 4.1 M/UL (ref 4–5.2)
SODIUM SERPL-SCNC: 141 MMOL/L (ref 136–145)
WBC # BLD AUTO: 5.6 K/UL (ref 4–11)

## 2024-04-11 PROCEDURE — 1123F ACP DISCUSS/DSCN MKR DOCD: CPT | Performed by: NURSE PRACTITIONER

## 2024-04-11 PROCEDURE — 93000 ELECTROCARDIOGRAM COMPLETE: CPT | Performed by: NURSE PRACTITIONER

## 2024-04-11 PROCEDURE — 99214 OFFICE O/P EST MOD 30 MIN: CPT | Performed by: NURSE PRACTITIONER

## 2024-04-11 ASSESSMENT — PATIENT HEALTH QUESTIONNAIRE - PHQ9
1. LITTLE INTEREST OR PLEASURE IN DOING THINGS: NOT AT ALL
SUM OF ALL RESPONSES TO PHQ9 QUESTIONS 1 & 2: 0
SUM OF ALL RESPONSES TO PHQ QUESTIONS 1-9: 0
1. LITTLE INTEREST OR PLEASURE IN DOING THINGS: NOT AT ALL
2. FEELING DOWN, DEPRESSED OR HOPELESS: NOT AT ALL
SUM OF ALL RESPONSES TO PHQ QUESTIONS 1-9: 0
2. FEELING DOWN, DEPRESSED OR HOPELESS: NOT AT ALL
SUM OF ALL RESPONSES TO PHQ9 QUESTIONS 1 & 2: 0

## 2024-04-11 ASSESSMENT — ENCOUNTER SYMPTOMS
COUGH: 0
ABDOMINAL PAIN: 0
VOMITING: 0
SHORTNESS OF BREATH: 0
NAUSEA: 0

## 2024-04-11 NOTE — PROGRESS NOTES
4/11/2024    This is a 73 y.o. female   Chief Complaint   Patient presents with    Chest Pain     Started this morning. Slight chest pressure. Has hx of shoulder pain. No sob or dizziness.    .    HPI    She walks 3-4 times per day. If she does not walk she will go to yoga for an hour. Has been doing more upper body exercises. She has some chronic tenderness in christine shoulders from likely arthritis.   This morning developed a heaviness on the left side of chest. Reports that heaviness lasted for lest than 5 minutes. Denies shortness of breath, headache, dizziness, heart palpitations.   This was after she woke up and was making coffee.   Has not had recurrent heaviness throughout the day. Denies worsening symptoms with activity.   Denies heartburn.     Reports that she has a strong family history of heart disease. Father had a massive MI and passed at age 49.     Hyperlipidemia:  No new myalgias or GI upset on simvastatin (Zocor). Medication compliance: compliant all of the time. Patient is  following a low fat, low cholesterol diet.  She is  exercising regularly.     Lab Results   Component Value Date    CHOL 190 10/17/2023    TRIG 52 10/17/2023    HDL 89 (H) 10/17/2023    LDLCALC 91 10/17/2023    LDLDIRECT 71 08/27/2013     Lab Results   Component Value Date    ALT 18 10/17/2023    AST 22 10/17/2023             Patient Active Problem List   Diagnosis    Hyperlipidemia    ARTEMIO on CPAP    S/P colonoscopy- 9/2014 WNL recall 10 years     Right knee pain    DDD (degenerative disc disease), lumbar    Primary osteoarthritis of right knee       Current Outpatient Medications   Medication Sig Dispense Refill    simvastatin (ZOCOR) 20 MG tablet TAKE ONE TABLET BY MOUTH ONCE NIGHTLY 90 tablet 3    Cholecalciferol (VITAMIN D) 50 MCG (2000 UT) CAPS capsule Take 125 capsules by mouth daily       No current facility-administered medications for this visit.       No Known Allergies    Review of Systems   Constitutional:  Negative for

## 2024-04-22 ENCOUNTER — HOSPITAL ENCOUNTER (OUTPATIENT)
Dept: NON INVASIVE DIAGNOSTICS | Age: 74
Discharge: HOME | End: 2024-04-22
Payer: MEDICARE

## 2024-04-22 DIAGNOSIS — R07.9 CHEST PAIN, UNSPECIFIED TYPE: ICD-10-CM

## 2024-04-22 PROCEDURE — 78452 HT MUSCLE IMAGE SPECT MULT: CPT

## 2024-04-22 PROCEDURE — A9502 TC99M TETROFOSMIN: HCPCS | Performed by: NURSE PRACTITIONER

## 2024-04-22 PROCEDURE — 3430000000 HC RX DIAGNOSTIC RADIOPHARMACEUTICAL: Performed by: NURSE PRACTITIONER

## 2024-04-22 PROCEDURE — 93017 CV STRESS TEST TRACING ONLY: CPT

## 2024-04-22 RX ADMIN — TETROFOSMIN 30 MILLICURIE: 1.38 INJECTION, POWDER, LYOPHILIZED, FOR SOLUTION INTRAVENOUS at 14:45

## 2024-04-22 RX ADMIN — TETROFOSMIN 10 MILLICURIE: 1.38 INJECTION, POWDER, LYOPHILIZED, FOR SOLUTION INTRAVENOUS at 13:30

## 2024-04-23 ENCOUNTER — TELEPHONE (OUTPATIENT)
Dept: FAMILY MEDICINE CLINIC | Age: 74
End: 2024-04-23

## 2024-04-23 DIAGNOSIS — R94.39 ABNORMAL CARDIOVASCULAR STRESS TEST: Primary | ICD-10-CM

## 2024-04-23 RX ORDER — ASPIRIN 81 MG/1
81 TABLET ORAL DAILY
Qty: 90 TABLET | Refills: 0 | Status: ON HOLD | COMMUNITY
Start: 2024-04-23

## 2024-04-23 NOTE — TELEPHONE ENCOUNTER
Spoke to the patient and reviewed the results. Gave referral info for cardio. The patient would like to see if she can get another referral to Dr. Wilfrido Lazo, a cardiologist at [facility]

## 2024-04-25 ENCOUNTER — TELEPHONE (OUTPATIENT)
Dept: CARDIOLOGY CLINIC | Age: 74
End: 2024-04-25

## 2024-04-25 ENCOUNTER — OFFICE VISIT (OUTPATIENT)
Dept: CARDIOLOGY CLINIC | Age: 74
End: 2024-04-25

## 2024-04-25 VITALS
HEIGHT: 65 IN | WEIGHT: 161 LBS | BODY MASS INDEX: 26.82 KG/M2 | HEART RATE: 74 BPM | SYSTOLIC BLOOD PRESSURE: 150 MMHG | DIASTOLIC BLOOD PRESSURE: 90 MMHG

## 2024-04-25 DIAGNOSIS — R07.9 CHEST PAIN, UNSPECIFIED TYPE: ICD-10-CM

## 2024-04-25 DIAGNOSIS — R94.39 ABNORMAL STRESS TEST: Primary | ICD-10-CM

## 2024-04-25 DIAGNOSIS — Z01.818 PRE-OP TESTING: ICD-10-CM

## 2024-04-25 NOTE — TELEPHONE ENCOUNTER
Hi Sayra this C has already been pre-cert.  Patient would like to have this done asap thanks luis e

## 2024-04-25 NOTE — PROGRESS NOTES
Troponin:    Lab Results   Component Value Date/Time    TROPONINI <0.01 09/20/2015 11:59 PM    TROPONINI 0.02 09/20/2015 07:13 PM    TROPONINI 0.03 09/20/2015 04:38 PM     FLP:    Lab Results   Component Value Date/Time    TRIG 52 10/17/2023 07:06 AM    HDL 89 10/17/2023 07:06 AM    LDLCALC 91 10/17/2023 07:06 AM    LDLDIRECT 71 08/27/2013 08:48 AM    Summary 4/22/24   There is a moderate size, moderate intensity, apical and mid to distal   anteroseptal wall perfusion defect primarily with stress images consistent   with ischemia.   Normal LV size and systolic function.   Left ventricular ejection fraction of 75 %.   There are no regional wall motion abnormalities.   Overall findings represent a intermediate risk scan.       EKG: EKG on 4/25/2024 shows a sinus rhythm at 74/min.  Normal study..  Echocardiogram pending   This patient was educated using the patient point room wall mount device. Absence from smokers and smoking and diet and exercising are important.  Assessment/ Plan          1. Chest pain, unspecified type  She needs heart cath.  Will plan to proceed with a heart cath radial when can be arranged.  In the meantime she has eliminated her yoga and her exercise activities pending a diagnostic evaluation.  - EKG 12 Lead  Start taking an aspirin 81 mg daily.      Thanks for allowing us the opportunity  to participate in the evaluation and care of your patients. Please call if we may assist further [phone]    This note was likely completed using voice recognition technology and may contain unintended errors  Wilfrido Lazo M.D., [facility]  4/25/20249:20 AM  Please CC this note to Dr. Ese Skelton

## 2024-04-25 NOTE — TELEPHONE ENCOUNTER
Left Heart Catheterization    A left heart catheterization is a procedure that provides your cardiologist with detailed information regarding how your heart functions.  A small catheter (long, fine tube) is inserted into an artery (a vessel that carries blood and oxygen) that leads to your heart.  While watching with x-ray equipment, small amounts of dye are injected which enables visualization of the heart arteries and chambers.  The pictures that your cardiologist receives from the cardiac catheterization enable him or her to decide on the best treatment for you.      Date of the procedure:       Time of arrival:      Cardiologist performing the procedure:       Instructions for your left heart catheterization:    1.  Bring a list of your medications to the hospital.    2.  Please notify us before the procedure if you are allergic to anything; especially x-ray contrast dye, iodine, nickel, or any type of jewelry.  This is very important!    3.  Do not eat or drink anything at all after midnight (or 8 hours) prior to the procedure.    4.  Take all morning medications EXCEPT any diuretics (water pills) the day of the procedure with a small sip of water.    5.  If you are on Coumadin, Warfarin, or Jantoven, please notify us so that we can make adjustments to your medication.    6.  If you are taking Xarelto, Eliquis, or Pradaxa, please stop staking these medications two days prior to the procedure (including the day of the procedure).    7.  If you are diabetic, check your blood sugar in the morning.  If your blood sugar is 120 or less, do not take insulin.  If your blood sugar is more than 120, take half the dose of your normal insulin.  Do not take Metformin the night before your procedure or morning of the procedure.    8.  You MUST have someone to drive you home--no driving for 24 hours after your procedure.  If an intervention is performed, you might stay overnight in the hospital.    9.  Discharge

## 2024-04-26 NOTE — PRE-PROCEDURE INSTRUCTIONS
Called patient about procedure. Told to be here at 1130 for procedure at 1300. Must be NPO after midnight but can take morning medication with sips of water. Patient is not on a blood thinner. Told to have a responsible adult with them to take them home and stay with them afterwards, if they do not get admitted to hospital. Also, to bring a current list of medications. No other questions or concerns.

## 2024-04-28 ENCOUNTER — APPOINTMENT (OUTPATIENT)
Dept: GENERAL RADIOLOGY | Age: 74
DRG: 287 | End: 2024-04-28
Payer: MEDICARE

## 2024-04-28 ENCOUNTER — HOSPITAL ENCOUNTER (INPATIENT)
Age: 74
LOS: 1 days | Discharge: HOME | DRG: 287 | End: 2024-04-29
Attending: STUDENT IN AN ORGANIZED HEALTH CARE EDUCATION/TRAINING PROGRAM | Admitting: INTERNAL MEDICINE
Payer: MEDICARE

## 2024-04-28 DIAGNOSIS — I20.0 UNSTABLE ANGINA (HCC): Primary | ICD-10-CM

## 2024-04-28 LAB
ALBUMIN SERPL-MCNC: 3.9 G/DL (ref 3.4–5)
ALP SERPL-CCNC: 70 U/L (ref 40–129)
ALT SERPL-CCNC: 15 U/L (ref 10–40)
ANION GAP SERPL CALCULATED.3IONS-SCNC: 12 MMOL/L (ref 3–16)
AST SERPL-CCNC: 22 U/L (ref 15–37)
BASOPHILS # BLD: 0.1 K/UL (ref 0–0.2)
BASOPHILS NFR BLD: 1.1 %
BILIRUB DIRECT SERPL-MCNC: <0.2 MG/DL (ref 0–0.3)
BILIRUB INDIRECT SERPL-MCNC: NORMAL MG/DL (ref 0–1)
BILIRUB SERPL-MCNC: <0.2 MG/DL (ref 0–1)
BUN SERPL-MCNC: 22 MG/DL (ref 7–20)
CALCIUM SERPL-MCNC: 9.2 MG/DL (ref 8.3–10.6)
CHLORIDE SERPL-SCNC: 103 MMOL/L (ref 99–110)
CO2 SERPL-SCNC: 22 MMOL/L (ref 21–32)
CREAT SERPL-MCNC: 1.2 MG/DL (ref 0.6–1.2)
DEPRECATED RDW RBC AUTO: 13.1 % (ref 12.4–15.4)
EOSINOPHIL # BLD: 0.1 K/UL (ref 0–0.6)
EOSINOPHIL NFR BLD: 2.6 %
GFR SERPLBLD CREATININE-BSD FMLA CKD-EPI: 48 ML/MIN/{1.73_M2}
GLUCOSE SERPL-MCNC: 115 MG/DL (ref 70–99)
HCT VFR BLD AUTO: 35.8 % (ref 36–48)
HGB BLD-MCNC: 12.4 G/DL (ref 12–16)
LYMPHOCYTES # BLD: 1.4 K/UL (ref 1–5.1)
LYMPHOCYTES NFR BLD: 23.8 %
MAGNESIUM SERPL-MCNC: 1.9 MG/DL (ref 1.8–2.4)
MCH RBC QN AUTO: 31 PG (ref 26–34)
MCHC RBC AUTO-ENTMCNC: 34.6 G/DL (ref 31–36)
MCV RBC AUTO: 89.6 FL (ref 80–100)
MONOCYTES # BLD: 0.4 K/UL (ref 0–1.3)
MONOCYTES NFR BLD: 6.7 %
NEUTROPHILS # BLD: 3.8 K/UL (ref 1.7–7.7)
NEUTROPHILS NFR BLD: 65.8 %
NT-PROBNP SERPL-MCNC: 46 PG/ML (ref 0–124)
PLATELET # BLD AUTO: 176 K/UL (ref 135–450)
PMV BLD AUTO: 8.6 FL (ref 5–10.5)
POTASSIUM SERPL-SCNC: 3.9 MMOL/L (ref 3.5–5.1)
PROT SERPL-MCNC: 6.5 G/DL (ref 6.4–8.2)
RBC # BLD AUTO: 4 M/UL (ref 4–5.2)
SODIUM SERPL-SCNC: 137 MMOL/L (ref 136–145)
TROPONIN, HIGH SENSITIVITY: 7 NG/L (ref 0–14)
TROPONIN, HIGH SENSITIVITY: 7 NG/L (ref 0–14)
WBC # BLD AUTO: 5.7 K/UL (ref 4–11)

## 2024-04-28 PROCEDURE — 83880 ASSAY OF NATRIURETIC PEPTIDE: CPT

## 2024-04-28 PROCEDURE — 2060000000 HC ICU INTERMEDIATE R&B

## 2024-04-28 PROCEDURE — 80048 BASIC METABOLIC PNL TOTAL CA: CPT

## 2024-04-28 PROCEDURE — 84484 ASSAY OF TROPONIN QUANT: CPT

## 2024-04-28 PROCEDURE — 83735 ASSAY OF MAGNESIUM: CPT

## 2024-04-28 PROCEDURE — 85025 COMPLETE CBC W/AUTO DIFF WBC: CPT

## 2024-04-28 PROCEDURE — 36415 COLL VENOUS BLD VENIPUNCTURE: CPT

## 2024-04-28 PROCEDURE — 71046 X-RAY EXAM CHEST 2 VIEWS: CPT

## 2024-04-28 PROCEDURE — 80076 HEPATIC FUNCTION PANEL: CPT

## 2024-04-28 PROCEDURE — 93005 ELECTROCARDIOGRAM TRACING: CPT | Performed by: STUDENT IN AN ORGANIZED HEALTH CARE EDUCATION/TRAINING PROGRAM

## 2024-04-28 PROCEDURE — 99285 EMERGENCY DEPT VISIT HI MDM: CPT

## 2024-04-28 ASSESSMENT — PAIN - FUNCTIONAL ASSESSMENT: PAIN_FUNCTIONAL_ASSESSMENT: 0-10

## 2024-04-28 ASSESSMENT — PAIN SCALES - GENERAL: PAINLEVEL_OUTOF10: 0

## 2024-04-29 ENCOUNTER — NURSE ONLY (OUTPATIENT)
Dept: CARDIOLOGY | Age: 74
End: 2024-04-29

## 2024-04-29 ENCOUNTER — HOSPITAL ENCOUNTER (OUTPATIENT)
Dept: CARDIAC CATH/INVASIVE PROCEDURES | Age: 74
Discharge: HOME | End: 2024-04-29
Payer: MEDICARE

## 2024-04-29 VITALS
SYSTOLIC BLOOD PRESSURE: 114 MMHG | WEIGHT: 153.22 LBS | RESPIRATION RATE: 15 BRPM | HEIGHT: 65 IN | TEMPERATURE: 97.9 F | HEART RATE: 70 BPM | OXYGEN SATURATION: 92 % | DIASTOLIC BLOOD PRESSURE: 66 MMHG | BODY MASS INDEX: 25.53 KG/M2

## 2024-04-29 LAB
ANION GAP SERPL CALCULATED.3IONS-SCNC: 7 MMOL/L (ref 3–16)
ANION GAP SERPL CALCULATED.3IONS-SCNC: 8 MMOL/L (ref 3–16)
BASOPHILS # BLD: 0.1 K/UL (ref 0–0.2)
BASOPHILS NFR BLD: 1.5 %
BUN SERPL-MCNC: 31 MG/DL (ref 7–20)
BUN SERPL-MCNC: 31 MG/DL (ref 7–20)
CALCIUM SERPL-MCNC: 8.8 MG/DL (ref 8.3–10.6)
CALCIUM SERPL-MCNC: 9.3 MG/DL (ref 8.3–10.6)
CHLORIDE SERPL-SCNC: 104 MMOL/L (ref 99–110)
CHLORIDE SERPL-SCNC: 110 MMOL/L (ref 99–110)
CO2 SERPL-SCNC: 24 MMOL/L (ref 21–32)
CO2 SERPL-SCNC: 25 MMOL/L (ref 21–32)
CREAT SERPL-MCNC: 0.9 MG/DL (ref 0.6–1.2)
CREAT SERPL-MCNC: 1.1 MG/DL (ref 0.6–1.2)
DEPRECATED RDW RBC AUTO: 13 % (ref 12.4–15.4)
DEPRECATED RDW RBC AUTO: 13.1 % (ref 12.4–15.4)
EKG ATRIAL RATE: 102 BPM
EKG ATRIAL RATE: 78 BPM
EKG DIAGNOSIS: NORMAL
EKG DIAGNOSIS: NORMAL
EKG P AXIS: 46 DEGREES
EKG P AXIS: 63 DEGREES
EKG P-R INTERVAL: 140 MS
EKG P-R INTERVAL: 154 MS
EKG Q-T INTERVAL: 326 MS
EKG Q-T INTERVAL: 380 MS
EKG QRS DURATION: 78 MS
EKG QRS DURATION: 88 MS
EKG QTC CALCULATION (BAZETT): 424 MS
EKG QTC CALCULATION (BAZETT): 433 MS
EKG R AXIS: 25 DEGREES
EKG R AXIS: 49 DEGREES
EKG T AXIS: 31 DEGREES
EKG T AXIS: 60 DEGREES
EKG VENTRICULAR RATE: 102 BPM
EKG VENTRICULAR RATE: 78 BPM
EOSINOPHIL # BLD: 0.2 K/UL (ref 0–0.6)
EOSINOPHIL NFR BLD: 3.3 %
GFR SERPLBLD CREATININE-BSD FMLA CKD-EPI: 53 ML/MIN/{1.73_M2}
GFR SERPLBLD CREATININE-BSD FMLA CKD-EPI: 68 ML/MIN/{1.73_M2}
GLUCOSE SERPL-MCNC: 105 MG/DL (ref 70–99)
GLUCOSE SERPL-MCNC: 94 MG/DL (ref 70–99)
HCT VFR BLD AUTO: 35.3 % (ref 36–48)
HCT VFR BLD AUTO: 35.7 % (ref 36–48)
HGB BLD-MCNC: 12 G/DL (ref 12–16)
HGB BLD-MCNC: 12.1 G/DL (ref 12–16)
INR PPP: 0.98 (ref 0.85–1.15)
LEFT VENTRICULAR EJECTION FRACTION HIGH VALUE: 65 %
LEFT VENTRICULAR EJECTION FRACTION MODE: NORMAL
LV EF: 60 %
LYMPHOCYTES # BLD: 1.6 K/UL (ref 1–5.1)
LYMPHOCYTES NFR BLD: 33.4 %
MCH RBC QN AUTO: 30.3 PG (ref 26–34)
MCH RBC QN AUTO: 30.5 PG (ref 26–34)
MCHC RBC AUTO-ENTMCNC: 33.9 G/DL (ref 31–36)
MCHC RBC AUTO-ENTMCNC: 34 G/DL (ref 31–36)
MCV RBC AUTO: 89.4 FL (ref 80–100)
MCV RBC AUTO: 89.6 FL (ref 80–100)
MONOCYTES # BLD: 0.4 K/UL (ref 0–1.3)
MONOCYTES NFR BLD: 7.8 %
NEUTROPHILS # BLD: 2.6 K/UL (ref 1.7–7.7)
NEUTROPHILS NFR BLD: 54 %
PLATELET # BLD AUTO: 161 K/UL (ref 135–450)
PLATELET # BLD AUTO: 170 K/UL (ref 135–450)
PMV BLD AUTO: 8.3 FL (ref 5–10.5)
PMV BLD AUTO: 8.3 FL (ref 5–10.5)
POTASSIUM SERPL-SCNC: 4 MMOL/L (ref 3.5–5.1)
POTASSIUM SERPL-SCNC: 4.1 MMOL/L (ref 3.5–5.1)
PROTHROMBIN TIME: 13.2 SEC (ref 11.9–14.9)
RBC # BLD AUTO: 3.95 M/UL (ref 4–5.2)
RBC # BLD AUTO: 3.98 M/UL (ref 4–5.2)
SODIUM SERPL-SCNC: 136 MMOL/L (ref 136–145)
SODIUM SERPL-SCNC: 142 MMOL/L (ref 136–145)
WBC # BLD AUTO: 4.9 K/UL (ref 4–11)
WBC # BLD AUTO: 5.6 K/UL (ref 4–11)

## 2024-04-29 PROCEDURE — 99152 MOD SED SAME PHYS/QHP 5/>YRS: CPT | Performed by: INTERNAL MEDICINE

## 2024-04-29 PROCEDURE — 6370000000 HC RX 637 (ALT 250 FOR IP)

## 2024-04-29 PROCEDURE — 99152 MOD SED SAME PHYS/QHP 5/>YRS: CPT

## 2024-04-29 PROCEDURE — 6370000000 HC RX 637 (ALT 250 FOR IP): Performed by: INTERNAL MEDICINE

## 2024-04-29 PROCEDURE — C1894 INTRO/SHEATH, NON-LASER: HCPCS

## 2024-04-29 PROCEDURE — 85027 COMPLETE CBC AUTOMATED: CPT

## 2024-04-29 PROCEDURE — 93005 ELECTROCARDIOGRAM TRACING: CPT | Performed by: INTERNAL MEDICINE

## 2024-04-29 PROCEDURE — 99233 SBSQ HOSP IP/OBS HIGH 50: CPT | Performed by: NURSE PRACTITIONER

## 2024-04-29 PROCEDURE — 2709999900 HC NON-CHARGEABLE SUPPLY

## 2024-04-29 PROCEDURE — 36415 COLL VENOUS BLD VENIPUNCTURE: CPT

## 2024-04-29 PROCEDURE — B2151ZZ FLUOROSCOPY OF LEFT HEART USING LOW OSMOLAR CONTRAST: ICD-10-PCS | Performed by: INTERNAL MEDICINE

## 2024-04-29 PROCEDURE — 2500000003 HC RX 250 WO HCPCS

## 2024-04-29 PROCEDURE — 85610 PROTHROMBIN TIME: CPT

## 2024-04-29 PROCEDURE — 4A023N7 MEASUREMENT OF CARDIAC SAMPLING AND PRESSURE, LEFT HEART, PERCUTANEOUS APPROACH: ICD-10-PCS | Performed by: INTERNAL MEDICINE

## 2024-04-29 PROCEDURE — 6360000002 HC RX W HCPCS

## 2024-04-29 PROCEDURE — C1769 GUIDE WIRE: HCPCS

## 2024-04-29 PROCEDURE — 85025 COMPLETE CBC W/AUTO DIFF WBC: CPT

## 2024-04-29 PROCEDURE — 80048 BASIC METABOLIC PNL TOTAL CA: CPT

## 2024-04-29 PROCEDURE — B2111ZZ FLUOROSCOPY OF MULTIPLE CORONARY ARTERIES USING LOW OSMOLAR CONTRAST: ICD-10-PCS | Performed by: INTERNAL MEDICINE

## 2024-04-29 PROCEDURE — 93010 ELECTROCARDIOGRAM REPORT: CPT | Performed by: INTERNAL MEDICINE

## 2024-04-29 PROCEDURE — 6360000004 HC RX CONTRAST MEDICATION: Performed by: INTERNAL MEDICINE

## 2024-04-29 PROCEDURE — 93458 L HRT ARTERY/VENTRICLE ANGIO: CPT | Performed by: INTERNAL MEDICINE

## 2024-04-29 PROCEDURE — 2580000003 HC RX 258: Performed by: INTERNAL MEDICINE

## 2024-04-29 PROCEDURE — 93458 L HRT ARTERY/VENTRICLE ANGIO: CPT

## 2024-04-29 RX ORDER — SODIUM CHLORIDE 9 MG/ML
INJECTION, SOLUTION INTRAVENOUS PRN
OUTPATIENT
Start: 2024-04-29

## 2024-04-29 RX ORDER — ONDANSETRON 4 MG/1
4 TABLET, ORALLY DISINTEGRATING ORAL EVERY 8 HOURS PRN
Status: DISCONTINUED | OUTPATIENT
Start: 2024-04-29 | End: 2024-04-29 | Stop reason: HOSPADM

## 2024-04-29 RX ORDER — ENOXAPARIN SODIUM 100 MG/ML
40 INJECTION SUBCUTANEOUS DAILY
Status: DISCONTINUED | OUTPATIENT
Start: 2024-04-29 | End: 2024-04-29 | Stop reason: HOSPADM

## 2024-04-29 RX ORDER — POTASSIUM CHLORIDE 20 MEQ/1
40 TABLET, EXTENDED RELEASE ORAL PRN
Status: DISCONTINUED | OUTPATIENT
Start: 2024-04-29 | End: 2024-04-29 | Stop reason: HOSPADM

## 2024-04-29 RX ORDER — ONDANSETRON 2 MG/ML
4 INJECTION INTRAMUSCULAR; INTRAVENOUS EVERY 6 HOURS PRN
Status: DISCONTINUED | OUTPATIENT
Start: 2024-04-29 | End: 2024-04-29 | Stop reason: HOSPADM

## 2024-04-29 RX ORDER — SODIUM CHLORIDE 0.9 % (FLUSH) 0.9 %
5-40 SYRINGE (ML) INJECTION EVERY 12 HOURS SCHEDULED
OUTPATIENT
Start: 2024-04-29

## 2024-04-29 RX ORDER — POLYETHYLENE GLYCOL 3350 17 G/17G
17 POWDER, FOR SOLUTION ORAL DAILY PRN
Status: DISCONTINUED | OUTPATIENT
Start: 2024-04-29 | End: 2024-04-29 | Stop reason: HOSPADM

## 2024-04-29 RX ORDER — SODIUM CHLORIDE 0.9 % (FLUSH) 0.9 %
5-40 SYRINGE (ML) INJECTION PRN
Status: DISCONTINUED | OUTPATIENT
Start: 2024-04-29 | End: 2024-04-29 | Stop reason: HOSPADM

## 2024-04-29 RX ORDER — ACETAMINOPHEN 325 MG/1
650 TABLET ORAL EVERY 4 HOURS PRN
OUTPATIENT
Start: 2024-04-29

## 2024-04-29 RX ORDER — SODIUM CHLORIDE 9 MG/ML
INJECTION, SOLUTION INTRAVENOUS PRN
Status: DISCONTINUED | OUTPATIENT
Start: 2024-04-29 | End: 2024-04-29 | Stop reason: HOSPADM

## 2024-04-29 RX ORDER — ACETAMINOPHEN 650 MG/1
650 SUPPOSITORY RECTAL EVERY 6 HOURS PRN
Status: DISCONTINUED | OUTPATIENT
Start: 2024-04-29 | End: 2024-04-29 | Stop reason: HOSPADM

## 2024-04-29 RX ORDER — SODIUM CHLORIDE 0.9 % (FLUSH) 0.9 %
5-40 SYRINGE (ML) INJECTION PRN
OUTPATIENT
Start: 2024-04-29

## 2024-04-29 RX ORDER — POTASSIUM CHLORIDE 7.45 MG/ML
10 INJECTION INTRAVENOUS PRN
Status: DISCONTINUED | OUTPATIENT
Start: 2024-04-29 | End: 2024-04-29 | Stop reason: HOSPADM

## 2024-04-29 RX ORDER — SODIUM CHLORIDE 9 MG/ML
INJECTION, SOLUTION INTRAVENOUS CONTINUOUS
Status: DISCONTINUED | OUTPATIENT
Start: 2024-04-29 | End: 2024-04-29 | Stop reason: HOSPADM

## 2024-04-29 RX ORDER — ATORVASTATIN CALCIUM 10 MG/1
10 TABLET, FILM COATED ORAL DAILY
Status: DISCONTINUED | OUTPATIENT
Start: 2024-04-29 | End: 2024-04-29 | Stop reason: HOSPADM

## 2024-04-29 RX ORDER — SODIUM CHLORIDE 0.9 % (FLUSH) 0.9 %
5-40 SYRINGE (ML) INJECTION EVERY 12 HOURS SCHEDULED
Status: DISCONTINUED | OUTPATIENT
Start: 2024-04-29 | End: 2024-04-29 | Stop reason: HOSPADM

## 2024-04-29 RX ORDER — ASPIRIN 81 MG/1
81 TABLET ORAL DAILY
Status: DISCONTINUED | OUTPATIENT
Start: 2024-04-29 | End: 2024-04-29 | Stop reason: HOSPADM

## 2024-04-29 RX ORDER — ACETAMINOPHEN 325 MG/1
650 TABLET ORAL EVERY 6 HOURS PRN
Status: DISCONTINUED | OUTPATIENT
Start: 2024-04-29 | End: 2024-04-29 | Stop reason: HOSPADM

## 2024-04-29 RX ORDER — MAGNESIUM SULFATE IN WATER 40 MG/ML
2000 INJECTION, SOLUTION INTRAVENOUS PRN
Status: DISCONTINUED | OUTPATIENT
Start: 2024-04-29 | End: 2024-04-29 | Stop reason: HOSPADM

## 2024-04-29 RX ORDER — SODIUM CHLORIDE 9 MG/ML
INJECTION, SOLUTION INTRAVENOUS CONTINUOUS
OUTPATIENT
Start: 2024-04-29 | End: 2024-04-29

## 2024-04-29 RX ADMIN — Medication 10 ML: at 08:39

## 2024-04-29 RX ADMIN — IOPAMIDOL 100 ML: 755 INJECTION, SOLUTION INTRAVENOUS at 13:42

## 2024-04-29 RX ADMIN — ATORVASTATIN CALCIUM 10 MG: 10 TABLET, FILM COATED ORAL at 08:35

## 2024-04-29 RX ADMIN — ASPIRIN 81 MG: 81 TABLET, COATED ORAL at 08:34

## 2024-04-29 RX ADMIN — SODIUM CHLORIDE: 9 INJECTION, SOLUTION INTRAVENOUS at 00:25

## 2024-04-29 RX ADMIN — SODIUM CHLORIDE: 9 INJECTION, SOLUTION INTRAVENOUS at 12:35

## 2024-04-29 ASSESSMENT — PAIN SCALES - GENERAL: PAINLEVEL_OUTOF10: 0

## 2024-04-29 NOTE — DISCHARGE INSTRUCTIONS
CAM MONITOR INSTRUCTIONS     Patient instructions for CAM monitor:  You will need to wear monitor for 2 weeks.   Mail monitor back or return to office on following date.    Remove date:  May 2nd       [facility]  [address]   [address]  [place], [place] [id number] [phone]         Make sure to save box as you will place monitor back in postage paid box to return to company.        After removing monitor stick it to template provided, place both your log and monitor in box and place in mailbox.          If monitor comes off but has been in place at least 2 days place in box and mail back.  If it comes off sooner than 7 days you will have to call office and return to have it replaced.        Avoid excess sweating to maximize wear time.         You are able to shower after 24 hours, however have majority of water hitting back and not directly on monitor. Do not submerge in bath.           If you experience any symptoms while wearing monitor push button and record in booklet.      For questions about monitor call: Customer Service [phone]

## 2024-04-29 NOTE — PROGRESS NOTES
1629:  Pt reports feeling ready for discharge.  ZIO monitor in place to left chest.  TR band removed and no evidence of bleeding noted.  See assessment flowsheet.  Tegaderm and gauze applied to right wrist and brace in place to right arm.  Pt verbalizes understanding to not remove brace for 24 hours.  Discharge paperwork reviewed with Pt. She has a copy in her possession.  Pt reports having all belongings.  She was escorted downstairs at 1637.  Pt left [facility] in no signs or symptoms of distress.  
[facility]   Cardiology  Note   Dr SARI Lazo MD, FACC   Barbra Wray RN, FNP APRN CVNP    Date: 4/29/2024    Admit Date: 4/28/2024       CC:cp     Following cardiology today for:  evaluate and assess this patient's abnormal stress test   with ARTEMIO on CPAP HTN HLD     Interval Hx /  Subjective:Today, she is NPO for [facility] this afternoon   No new complaints today. No major events overnight.   LHC indicated, risks benefits & alternatives has been discussed         medical Decision Making:  Discussion of patient care with other providers  Patient seen and examined. Clinical notes reviewed. Telemetry reviewed / Pertinent labs, diagnostic, device, and imaging results reviewed as a part of this visit  I spent a total of 50 minutes and greater than 50% of the time was spent counseling with patient  coordinating care regarding her diagnosis, treatments and plan of care    Past Medical History:  Past Medical History:   Diagnosis Date    Chest pain Sept.24, 2015    Coronary angiogram by Dr. Aguilera revealed completely normal coronary arteries with normal LVEF and no regional wall notion abnormalities.    Encounter for screening mammogram for breast cancer 10/21/2015    Negative    Herpes zoster Sept., 2015    Chest wall    Hyperlipidemia     Hyperthyroidism     treated    Obstructive sleep apnea 2019    Dr. Dietz    Osteopenia DEXA - June, 2009    Lumbar T score  -0.6 and Hip T score -0.4    Osteopenia DEXA - July, 2012    Lumbar T score -0.7 and Hip T score -0.7    Osteopenia DEXA-May, 2018    Lumbar T score of -0.7 and hip T score of -0.4  FRAX - 8.7/0.8 %    Other screening mammogram July 5, 2011    Negative    Other screening mammogram July, 9, 2012    Negative    Other screening mammogram Aug. 27, 2013    Negative     Other screening mammogram October 2,2015    Negative    Screening mammogram for high-risk patient *November 8, 2019    Negative    Screening mammogram, encounter for *October 28, 2016    
Labs     04/28/24  2136 04/29/24  0121   PROT 6.5  --    INR  --  0.98     APTT: No results for input(s): \"APTT\" in the last 72 hours.  ABG: No results for input(s): \"PHART\", \"HEH0TXZ\", \"PO2ART\" in the last 72 hours.    Any consults during the shift? No    Any signed and held orders to be released?  No        4 Eyes Skin Assessment       The patient is being assessed for  Admission    I agree that at least one RN has performed a thorough Head to Toe Skin Assessment on the patient. ALL assessment sites listed below have been assessed.      Areas assessed by both nurses: Head, Face, Ears, Shoulders, Back, Chest, Arms, Elbows, Hands, Sacrum. Buttock, Coccyx, Ischium, Legs. Feet and Heels, and Under Medical Devices         Does the Patient have a Wound? No noted wound(s)    Wound Care Orders initiated by RN: No       Nelson Prevention initiated by RN: Yes    Pressure Injury (Stage 3,4, Unstageable, DTI, NWPT, and Complex wounds) if present, place Wound referral order by RN under : No    New Ostomies, if present place, Ostomy referral order under : No     Nurse 1 eSignature: Electronically signed by Pj Montiel RN on 4/29/24 at 5:20 AM EDT    **SHARE this note so that the co-signing nurse can place an eSignature**    Nurse 2 eSignature: Electronically signed by Mellissa Dickson RN on 4/29/24 at 5:22 AM EDT

## 2024-04-29 NOTE — ED PROVIDER NOTES
THE [facility]  EMERGENCY DEPARTMENT ENCOUNTER          EM RESIDENT NOTE       Date of evaluation: 4/28/2024    Chief Complaint     Chest Pain (Pt states she was out for a walk tonight and began to feel dizzy and started experiencing some chest pressure. Pt is scheduled for an angiogram and stent placement tomorrow. Took 81 mg ASA at home, was given 3 baby ASA and 1 SL nitro per EMS. Chest pressure has currently subsided. )      History of Present Illness     Uyen Ramos is a 73 y.o. female with a PMHx of abnormal stress test due to have left heart cath tomorrow, further history as noted below who presents to the emergency department today with chest pressure.  Patient states that she was walking around her apartment complex when she all the sudden divan developed worsening left-sided chest pressure.  She states that throughout the day she has noticed some left-sided chest pressure radiating into her left arm that felt abnormal.  However, when she began it was exerting herself, the chest pressure became more severe.  She is unable to rated on a scale of 1-10.  She was given a full aspirin load by squad and nitroglycerin with some improvement of her pain.  She denies any chest pain at this time.  No radiation to the back.  Denies any numbness, tingling, or weakness.  No additional symptoms at this time.  She did feel lightheaded as well as nauseous when she developed her symptoms    Other than that mentioned above, there are no other alleviating or provoking factors associated with the patient's presentation today.    Review of Systems     Review of Systems    Review of systems is positive for chest pressure   Review of systems is negative for abd pain  Further review of systems is negative other than that mentioned in the HPI.     Past Medical, Surgical, Family, and Social History     She has a past medical history of Chest pain, Encounter for screening mammogram for breast cancer, Herpes zoster,

## 2024-04-29 NOTE — PROCEDURES
[facility]                                              INTERVENTIONAL CARDIOLOGY                                           [facility]                                                LEFT HEART CATH    Uyen Ramos   73 y.o., female  1950 4/29/2024    Procedure performed by Dr. Wilfrido Lazo MD, [facility]  Surgical assistants :none    Procedure  Selective Coronary Angiography  Cardiac Catheterization for Coronary Anatomy  Left Heart Catheterization  Left Ventriculogram  Radial artery access assisted by ultrasound  Arterial Access Right Radial Artery after a negative Mukul test  TR Band    Any and all anesthesia was administered by my staff under my direct supervision and monitored by a trained independent observer.    Indication:Cardiac cath to rule out ischemic CAD, Possible angioplasty, The procedure and risks described to patient including risk of CVA, MI, bleeding, emergency surgery, death,  , Consent signed, or positive stress test  Unspecified Angina  Anesthesia: Moderate sedation with Versed and Fentanyl IV  Anesthesia Start time 1325  Anesthesia end time 1346  Estimated blood loss :minimal    Specimen removed: NONE    Abnormal Stress Test      Procedure Description:  After written informed consent was obtained, the patient was   brought to the cardiac catheterization suite, where patient was prepped and   draped in the usual sterile fashion. Local anesthesia was achieved in the   right wrist with 2% lidocaine. A 5-Paraguayan hemostasis sheath was placed into   the radial artery.    The pre cocktail of heparin, verapamil and nitroglycerin was injected into the sheath    The JR4 catheter was introduced  to engage the right coronary   artery. Radiographic  images were obtained. The catheter was removed and exchanged over an exchange length 0.35 soft guide wire. .         A

## 2024-04-29 NOTE — ANESTHESIA PRE-OP
[facility]  2024, 3:59 PM    H&P Update    I have reviewed the history and physical and examined the patient and find no relevant changes.   I have reviewed with the patient and/or family the risks, benefits, and alternatives to the procedure.    Pre-sedation Assessment    Patient:  Uyen Ramos   :   1950  Intended Procedure: cath and possible intervention  Procedure indications chest pains positive stress test    [facility] nurses notes reviewed and agreed.  Medications reviewed  Allergies: No Known Allergies    Vitals:    24 1445 24 1500 24 1513 24 1534   BP: 125/67 117/73 122/73 121/67   Pulse: 67 64 68 62   Resp: 17 16 15 19   Temp:  97.9 °F (36.6 °C)     TempSrc:  Oral     SpO2:  95%     Weight:       Height:           Pre-Procedure Assessment/Plan:  ASA 1 - Normal health patient  Mallampati score : Mallampati 1Level of Sedation Plan:Mild sedation    Post Procedure plan: Return to same level of care  Wilfrido Lazo M.D.,FACC

## 2024-04-29 NOTE — ED PROVIDER NOTES
ED Attending Attestation Note     Date of evaluation: 4/28/2024    This patient was seen by the resident.  I have seen and examined the patient, agree with the workup, evaluation, management and diagnosis. The care plan has been discussed.  My assessment reveals a 73-year-old female with history of hypertension, hyperlipidemia and an abnormal stress test on 4/22/2025 who was planned to undergo a left heart cath with Dr. Lazo and presents today with chest pain and dizziness.  Patient is mildly tachycardic on arrival here but otherwise well-appearing.  She was aspirin loaded and given nitroglycerin by EMS.  She is currently minimally symptomatic but does describe unstable angina symptoms.  She has equal pulses in all 4 extremities and a nonfocal neurologic exam.  Will plan to admit for unstable angina.      4/22/24 stress   Summary   There is a moderate size, moderate intensity, apical and mid to distal   anteroseptal wall perfusion defect primarily with stress images consistent   with ischemia.   Normal LV size and systolic function.   Left ventricular ejection fraction of 75 %.   There are no regional wall motion abnormalities.   Overall findings represent a intermediate risk scan.      Critical Care:  Due to the immediate potential for life-threatening deterioration due to unstable angina, I spent 33 minutes providing critical care.  This time excludes time spent performing procedures but includes time spent on direct patient care, history retrieval, review of the chart, and discussions with patient, family, and consultant(s).     Chauncey Stewart MD  04/28/24 2833

## 2024-04-29 NOTE — DISCHARGE SUMMARY
V2.0  Discharge Summary    Name:  Uyen Ramos /Age/Sex: 1950 (73 y.o. female)   Admit Date: 2024  Discharge Date: 24    MRN & CSN:  [id number] & [id number] Encounter Date and Time 24 2:57 PM EDT    Attending:  Saadia Sanderson MD Discharging Provider: Saadia Sanderson MD       Hospital Course:     Brief HPI and plan: Uyen Ramos is a 73 y.o. female who presented with Uyen Ramos is a 73 y.o. female with pmh of hyperlipidemia who presents with Unstable angina (HCC).  Patient was seen by cardiology in the past and had a stress test on 2024 which showed moderate reversible ischemia.  Patient was scheduled for outpatient cardiac angiogram but had worsening chest pain hence came to the ED.  Patient underwent a cardiac angiogram which showed no significant coronary disease.  Patient will need outpatient follow-up with PCP for further workup.  To continue aspirin and statin on discharge             The patient expressed appropriate understanding of, and agreement with the discharge recommendations, medications, and plan.     Consults this admission:  IP CONSULT TO CARDIOLOGY    Discharge Diagnosis:   Chest pain noncardiac    Discharge Instruction:   Follow up appointments:   Primary care physician: Ese Skelton MD within 2 weeks  Diet: regular diet   Activity: activity as tolerated  Disposition: Discharged to:   [x]Home, [][facility], []SNF, []Acute Rehab, []Hospice   Condition on discharge: Stable  Labs and Tests to be Followed up as an outpatient by PCP or Specialist:     Discharge Medications:        Medication List        CONTINUE taking these medications      aspirin 81 MG EC tablet  Take 1 tablet by mouth daily     simvastatin 20 MG tablet  Commonly known as: ZOCOR  TAKE ONE TABLET BY MOUTH ONCE NIGHTLY     vitamin D 50 MCG ( UT) Caps capsule             Objective Findings at Discharge:   /67   Pulse 67   Temp 97.9 °F (36.6 °C) (Oral)   Resp 17   Ht 1.651 m

## 2024-04-29 NOTE — ED NOTES
abnormalities.    Encounter for screening mammogram for breast cancer 10/21/2015    Negative    Herpes zoster Sept., 2015    Chest wall    Hyperlipidemia     Hyperthyroidism     treated    Obstructive sleep apnea 2019    Dr. Dietz    Osteopenia DEXA - June, 2009    Lumbar T score  -0.6 and Hip T score -0.4    Osteopenia DEXA - July, 2012    Lumbar T score -0.7 and Hip T score -0.7    Osteopenia DEXA-May, 2018    Lumbar T score of -0.7 and hip T score of -0.4  FRAX - 8.7/0.8 %    Other screening mammogram July 5, 2011    Negative    Other screening mammogram July, 9, 2012    Negative    Other screening mammogram Aug. 27, 2013    Negative     Other screening mammogram October 2,2015    Negative    Screening mammogram for high-risk patient *November 8, 2019    Negative    Screening mammogram, encounter for *October 28, 2016    Begative    Screening mammogram, encounter for *October 30, 2017    Negative    Screening mammogram, encounter for *October 29, 2018    Benign       Assessment    Vitals/MEWS:    Level of Consciousness: Alert (0)   Vitals:    04/28/24 2116 04/28/24 2139 04/28/24 2153   BP: 137/83     Pulse: (!) 103 95 92   Resp: 18 17 17   SpO2: 95% 95% 95%   Weight: 73 kg (160 lb 15 oz)     Height: 1.651 m (5' 5\")       FiO2 (%):   O2 Flow Rate: O2 Device: None (Room air)    Cardiac Rhythm:    Pain Assessment:  [x] Verbal [] Tom Dillard Scale  Pain Scale: Pain Assessment  Pain Assessment: 0-10  Pain Level: 0  Last documented pain score (0-10 scale) Pain Level: 0  Last documented pain medication administered: See MAR  Mental Status: oriented, alert, coherent, logical, thought processes intact, and able to concentrate and follow conversation  Orientation Level:    NIH Score:    C-SSRS:    Bedside swallow:    [place] Coma Scale (GCS): Shivam Coma Scale  Eye Opening: Spontaneous  Best Verbal Response: Oriented  Best Motor Response: Obeys commands  [place] Coma Scale Score: 15  Active LDA's:   Peripheral IV 04/28/24

## 2024-04-29 NOTE — CARE COORDINATION
Case Management Assessment  Initial Evaluation     Date/Time of Evaluation: 4/29/2024 10:23 AM  Assessment Completed by: FRANKLIN Kemp   for [facility] ([facility])  [place] [employer]: [phone]    If patient is discharged prior to next notation, then this note serves as note for discharge by case management.    Patient Name: Uyen Ramos                   YOB: 1950  Diagnosis: Unstable angina (HCC) [I20.0]                   Date / Time: 4/28/2024  9:12 PM    Patient Admission Status: Inpatient   Readmission Risk (Low < 19, Mod (19-27), High > 27): Readmission Risk Score: 5.8    Current PCP: Ese Skelton MD  PCP verified by CM? Yes    Chart Reviewed: Yes      History Provided by: Patient  Patient Orientation: Alert and Oriented    Patient Cognition: Alert    Hospitalization in the last 30 days (Readmission):  No    If yes, Readmission Assessment in CM Navigator will be completed.    Advance Directives:      Code Status: Full Code   Patient's Primary Decision Maker is: Named in Scanned ACP Document    Primary Decision Maker: Jose Ramos - Spouse - [phone]    Secondary Decision Maker: Rose Johnson - Child - [phone]    Discharge Planning:    Patient lives with: Spouse/Significant Other Type of Home: House  Primary Care Giver: Self  Patient Support Systems include: Spouse/Significant Other, Children   Current Financial resources: Medicare ([employer] Medicare)  Current community resources: None  Current services prior to admission: None            Current DME:              Type of Home Care services:  None    ADLS  Prior functional level: Independent in ADLs/IADLs  Current functional level: Independent in ADLs/IADLs    PT AM-PAC:   /24  OT AM-PAC:   /24    Family can provide assistance at DC: Yes  Would you like Case Management to discuss the discharge plan with any other family members/significant others, and if so, who? Yes  Plans to

## 2024-04-29 NOTE — PLAN OF CARE
Problem: Pain  Goal: Verbalizes/displays adequate comfort level or baseline comfort level  4/29/2024 1829 by James Leavitt RN  Outcome: Completed  4/29/2024 0521 by Pj Montiel RN  Outcome: Progressing     Problem: ABCDS Injury Assessment  Goal: Absence of physical injury  4/29/2024 1829 by James Leavitt RN  Outcome: Completed  4/29/2024 0521 by Pj Montiel RN  Outcome: Progressing     Problem: Safety - Adult  Goal: Free from fall injury  4/29/2024 1829 by James Leavitt RN  Outcome: Completed  4/29/2024 0916 by James Leavitt RN  Note: Orthostatic vital signs obtained at start of shift - see flowsheet for details.  Pt does not meet criteria for orthostasis.  Pt is a Med fall risk. See Jimenez Fall Score and ABCDS Injury Risk assessments.   - Screening for Orthostasis AND not a [place] Risk per JIMENEZ/ABCDS: Pt bed is in low position, side rails up, call light and belongings are in reach.  Fall risk light is on outside pts room.  Pt encouraged to call for assistance as needed. Will continue with hourly rounds for PO intake, pain needs, toileting and repositioning as needed.   4/29/2024 0521 by Pj Montiel RN  Outcome: Progressing

## 2024-04-29 NOTE — ACP (ADVANCE CARE PLANNING)
Advance Care Planning     General Advance Care Planning (ACP) Conversation    Date of Conversation: 4/29/2024  Conducted with: Patient with Decision Making Capacity  Other persons present: None    Healthcare Decision Maker:   Primary Decision Maker: Jose Ramos - Spouse - [phone]    Secondary Decision Maker: Rose Johnson - Child - [phone]  Click here to complete Healthcare Decision Makers including selection of the Healthcare Decision Maker Relationship (ie \"Primary\").   Today we documented Decision Maker(s) consistent with ACP documents on file.      Length of Voluntary ACP Conversation in minutes:  <16 minutes (Non-Billable)    FRANKLIN Kemp   for [facility] [place] ([facility])  [employer] Mobile: [phone]

## 2024-04-29 NOTE — H&P
Hospital Medicine History & Physical      Date of Admission: 4/28/2024    Date of Service:  Pt seen/examined on 4/28/2024    [x]Admitted to Inpatient with expected LOS greater than two midnights due to medical therapy.  []Placed in Observation status.    Chief Admission Complaint: Unstable angina    Presenting Admission History:      73 y.o. female who presented to [facility] with unstable angina.  PMHx significant for hyperlipidemia.  Patient has been having chest pain on and off on occasion, she was evaluated by cardiology and had stress test done on 4/22/2024 which was abnormal for moderate reversible ischemia and she was scheduled to have left heart cath with Dr. Lazo tomorrow, tonight she was out for dinner and had a glass of red wine and then she felt after that flushed and uncomfortable was having some dyspnea, she said she was hypoxic in the EMS so she was placed on oxygen, she received a full dose of aspirin by squad and 1 nitroglycerin with improvement of her symptoms.  In the emergency room she was evaluated and her troponin was flat at 7, her creatinine is 1.2 with GFR of 48, chest x-ray showed no acute finding.    Finding and plan of care reviewed with  and daughter at bedside.    Assessment/Plan:      Current Principal Problem:  Unstable angina (HCC)    1.  Chest pain with unstable angina and abnormal stress test patient is scheduled for heart cath in the morning as an outpatient so we will consult cardiology to have the cath done while she is in house, she had received a full dose of aspirin 325 mg in the ambulance with a dose of nitroglycerin and resolution of her symptoms, will place patient on mild hydration with normal saline at 75 cc/h since she may receive contrast especially creatinine is 1.2.  2.  Hyperlipidemia patient is on simvastatin which will be continued.    Discussed management and the need for Hospitalization of the patient w/ the Emergency Department

## 2024-04-29 NOTE — DISCHARGE INSTR - COC
Continuity of Care Form    Patient Name: Uyen Ramos   :  1950  MRN:  [id number]    Admit date:  2024  Discharge date:  ***    Code Status Order: Full Code   Advance Directives:     Admitting Physician:  Li Mccall MD  PCP: Ese Skelton MD    Discharging Nurse: ***  Discharging Hospital Unit/Room#: 3518/3518-01  Discharging Unit Phone Number: ***    Emergency Contact:   Extended Emergency Contact Information  Primary Emergency Contact: Jose Ramos   [facility]  Home Phone: [phone]  Mobile Phone: [phone]  Relation: Spouse  Secondary Emergency Contact: ElizabethRose  Address: [address] [place] [address]  Home Phone: [phone]  Work Phone: [phone]  Mobile Phone: [phone]  Relation: Child    Past Surgical History:  Past Surgical History:   Procedure Laterality Date    CARDIAC CATHETERIZATION  2015    Dr. Wendy Aguilera    COLONOSCOPY   (  )    Dr. Dave fisher.    COLONOSCOPY  2014 (  )    Dr. Rivero - mild diverticulosis    INTRACAPSULAR CATARACT EXTRACTION  *    Dr. Tovar - bilaterally       Immunization History:   Immunization History   Administered Date(s) Administered    COVID-19, MODERNA BLUE border, Primary or Immunocompromised, (age 12y+), IM, 100 mcg/0.5mL 02/10/2021, 2021, 2021    Hep A, HAVRIX, VAQTA, (age 19y+), IM, 1mL 2018    Influenza, FLUAD, (age 65 y+), Adjuvanted, 0.5mL 2021, 2022    Influenza, High Dose (Fluzone 65 yrs and older) 10/30/2017    Pneumococcal, PCV-13, PREVNAR 13, (age 6w+), IM, 0.5mL 10/29/2016    Pneumococcal, PPSV23, PNEUMOVAX 23, (age 2y+), SC/IM, 0.5mL 10/30/2017    Td, unspecified formulation 2009    Zoster Live (Zostavax) 10/27/2014    Zoster Recombinant (Shingrix) 2020, 09/10/2021       Active Problems:  Patient Active Problem List   Diagnosis Code    Hyperlipidemia E78.5

## 2024-04-29 NOTE — FLOWSHEET NOTE
Returned from cardiac cath.  Pt stable, no distress.  Educated Pt on process for removing TR band.

## 2024-04-29 NOTE — PLAN OF CARE
Problem: Safety - Adult  Goal: Free from fall injury  4/29/2024 0916 by James Leavitt RN  Note: Orthostatic vital signs obtained at start of shift - see flowsheet for details.  Pt does not meet criteria for orthostasis.  Pt is a Med fall risk. See Omalley Fall Score and ABCDS Injury Risk assessments.   - Screening for Orthostasis AND not a [place] Risk per OMALLEY/ABCDS: Pt bed is in low position, side rails up, call light and belongings are in reach.  Fall risk light is on outside pts room.  Pt encouraged to call for assistance as needed. Will continue with hourly rounds for PO intake, pain needs, toileting and repositioning as needed.

## 2024-04-30 ENCOUNTER — CARE COORDINATION (OUTPATIENT)
Dept: CASE MANAGEMENT | Age: 74
End: 2024-04-30

## 2024-04-30 ENCOUNTER — TELEPHONE (OUTPATIENT)
Dept: FAMILY MEDICINE CLINIC | Age: 74
End: 2024-04-30

## 2024-04-30 PROCEDURE — 93242 EXT ECG>48HR<7D RECORDING: CPT | Performed by: INTERNAL MEDICINE

## 2024-04-30 NOTE — CARE COORDINATION
Is follow up appointment scheduled within 7 days of discharge? No.  Patient states she will contact PCP office to scheduled HFU appointment.    Follow Up  Future Appointments   Date Time Provider Department [place]   5/7/2024 10:45 AM Barbra Wray APRN - CNP [facility]       Care Transition Nurse provided contact information.  Plan for follow-up call in 5-7 days based on severity of symptoms and risk factors.  Plan for next call: symptom management-any returning chest pain?  self management-Medication Management.  follow-up appointment-HFU with PCP has been scheduled?    Thank You,    Sussy Phelps RN  Care Transition Coordinator  Contact Number:[phone]

## 2024-05-07 ENCOUNTER — OFFICE VISIT (OUTPATIENT)
Dept: CARDIOLOGY CLINIC | Age: 74
End: 2024-05-07
Payer: MEDICARE

## 2024-05-07 ENCOUNTER — OFFICE VISIT (OUTPATIENT)
Dept: FAMILY MEDICINE CLINIC | Age: 74
End: 2024-05-07
Payer: MEDICARE

## 2024-05-07 VITALS
DIASTOLIC BLOOD PRESSURE: 80 MMHG | HEIGHT: 65 IN | WEIGHT: 159.8 LBS | SYSTOLIC BLOOD PRESSURE: 120 MMHG | RESPIRATION RATE: 12 BRPM | OXYGEN SATURATION: 97 % | BODY MASS INDEX: 26.62 KG/M2 | HEART RATE: 83 BPM

## 2024-05-07 VITALS
DIASTOLIC BLOOD PRESSURE: 80 MMHG | HEIGHT: 65 IN | HEART RATE: 70 BPM | SYSTOLIC BLOOD PRESSURE: 122 MMHG | WEIGHT: 160 LBS | BODY MASS INDEX: 26.66 KG/M2

## 2024-05-07 DIAGNOSIS — E78.00 PURE HYPERCHOLESTEROLEMIA: Primary | ICD-10-CM

## 2024-05-07 DIAGNOSIS — R94.39 ABNORMAL CARDIOVASCULAR STRESS TEST: ICD-10-CM

## 2024-05-07 DIAGNOSIS — E78.5 HYPERLIPIDEMIA, UNSPECIFIED HYPERLIPIDEMIA TYPE: ICD-10-CM

## 2024-05-07 DIAGNOSIS — R79.9 ELEVATED BUN: ICD-10-CM

## 2024-05-07 DIAGNOSIS — R55 PRE-SYNCOPE: Primary | ICD-10-CM

## 2024-05-07 DIAGNOSIS — R94.4 DECREASED GFR: ICD-10-CM

## 2024-05-07 PROCEDURE — 99214 OFFICE O/P EST MOD 30 MIN: CPT | Performed by: INTERNAL MEDICINE

## 2024-05-07 PROCEDURE — 1123F ACP DISCUSS/DSCN MKR DOCD: CPT | Performed by: NURSE PRACTITIONER

## 2024-05-07 PROCEDURE — 1123F ACP DISCUSS/DSCN MKR DOCD: CPT | Performed by: INTERNAL MEDICINE

## 2024-05-07 PROCEDURE — 99215 OFFICE O/P EST HI 40 MIN: CPT | Performed by: NURSE PRACTITIONER

## 2024-05-07 NOTE — PROGRESS NOTES
[facility]   Cardiology  Note   Dr SARI Lazo MD, FACC   Barbra Wray RN, FNP APRN CVNP  Date: 5/7/2024    Ms Ramos is here after [facility]  with HLD and on statin   stress test on 4/22/2024 which showed moderate reversible ischemia.  Patient was scheduled for outpatient cardiac angiogram but had worsening chest pain hence came to the ED.  Patient underwent a cardiac angiogram which showed no coronary disease.   Today VSS wt stable right wrist no complications  no cp sob edema PND or ARTEMIO   Eats well sleeps well active   Wore Zio monitor and not back yet / we can call with results   Fu as needed     Medical Decision Making:  Discussion of patient care with other providers  Patient seen and examined. Clinical notes reviewed. Telemetry reviewed / Pertinent labs, diagnostic, device, and imaging results reviewed as a part of this visit  I spent a total of 50 minutes and greater than 50% of the time was spent counseling with patient  coordinating care regarding her diagnosis, treatments and plan of care    Past Medical History:   Diagnosis Date    Chest pain Sept.24, 2015    Coronary angiogram by Dr. Aguilera revealed completely normal coronary arteries with normal LVEF and no regional wall notion abnormalities.    Encounter for screening mammogram for breast cancer 10/21/2015    Negative    Herpes zoster Sept., 2015    Chest wall    Hyperlipidemia     Hyperthyroidism     treated    Obstructive sleep apnea 2019    Dr. Dietz    Osteopenia DEXA - June, 2009    Lumbar T score  -0.6 and Hip T score -0.4    Osteopenia DEXA - July, 2012    Lumbar T score -0.7 and Hip T score -0.7    Osteopenia DEXA-May, 2018    Lumbar T score of -0.7 and hip T score of -0.4  FRAX - 8.7/0.8 %    Other screening mammogram July 5, 2011    Negative    Other screening mammogram July, 9, 2012    Negative    Other screening mammogram Aug. 27, 2013    Negative     Other screening mammogram October 2,2015    Negative    Screening mammogram

## 2024-05-07 NOTE — PROGRESS NOTES
Post-Discharge Transitional Care  Follow Up      Uyen Ramos   YOB: 1950    Date of Office Visit:  5/7/2024  Date of Hospital Admission: 4/28/24  Date of Hospital Discharge: 4/29/24  Risk of hospital readmission (high >=14%. Medium >=10%) :Readmission Risk Score: 5.7      Care management risk score Rising risk (score 2-5) and Complex Care (Scores >=6): No Risk Score On File     Non face to face  following discharge, date last encounter closed (first attempt may have been earlier): 04/30/2024    Call initiated 2 business days of discharge: Yes    ASSESSMENT/PLAN:   Pre-syncope  Abnormal cardiovascular stress test  Elevated BUN  -     Renal Function Panel; Future  Decreased GFR  -     Renal Function Panel; Future  Hyperlipidemia, unspecified hyperlipidemia type    Admitted for wup for abn stress and sweating /presyncope and had already had abn stress test.  Admitted had LHC OK per cardiology note  Few renals shown elevated bun , dec GFR  Discussed with pt  Repeat renal in a couple of week  Stay hydrated  Avoid nsaids  Medical Decision Making: moderate complexity  Bp low with donating blood  Follow up in oct  Patient Instructions   NEED 1200 MG CALCIUM/DAY TOTAL IN DIET AND SUPPLEMENTS IF NEEDED  DON'T TAKE MORE THAN 600MG CALCIUM/ AT ONE TIME  GENERAL RULE TAKE Vit D over the counter   D3- 1000IU ( 25mcg)/day         Avoid nsaids       Subjective:   HPI:  Follow up of Hospital problems/diagnosis(es): had out pt stress test 4/22/24  mod rev. Ischemia.  Had cpain talking with a neighbor . Admitted to hospital  911 called.  She did not have chest pain  but felt like she would pass out and broke out in a sweat.    Had cath per cardiology note today  No CAD with LHC  Wore a monitor and turned in , no results yet  Walked 3.5 miles this am  No shortness of breath , no chest pain    Inpatient course: Discharge summary reviewed- see chart.    Interval history/Current status: pt admitted     Reviewed

## 2024-05-07 NOTE — PATIENT INSTRUCTIONS
NEED 1200 MG CALCIUM/DAY TOTAL IN DIET AND SUPPLEMENTS IF NEEDED  DON'T TAKE MORE THAN 600MG CALCIUM/ AT ONE TIME  GENERAL RULE TAKE Vit D over the counter   D3- 1000IU ( 25mcg)/day

## 2024-05-08 ENCOUNTER — CARE COORDINATION (OUTPATIENT)
Dept: CASE MANAGEMENT | Age: 74
End: 2024-05-08

## 2024-05-08 NOTE — CARE COORDINATION
Date/Time:  5/8/2024 1:36 PM  Attempted to reach patient by telephone. Call within 2 business days of discharge: Yes,  Left HIPPA compliant message requesting a return call. Will attempt to reach patient again.    Thank You,    Sussy Phelps RN  Care Transition Coordinator  Contact Number:[phone]

## 2024-05-10 ENCOUNTER — TELEPHONE (OUTPATIENT)
Dept: CARDIOLOGY CLINIC | Age: 74
End: 2024-05-10

## 2024-05-10 NOTE — TELEPHONE ENCOUNTER
Spoke with pt gave her results to her most recent holter monitor. Pt wanted copy of reprt. Will mail her a copy.

## 2024-05-16 ENCOUNTER — CARE COORDINATION (OUTPATIENT)
Dept: CASE MANAGEMENT | Age: 74
End: 2024-05-16

## 2024-05-16 PROCEDURE — 93244 EXT ECG>48HR<7D REV&INTERPJ: CPT | Performed by: INTERNAL MEDICINE

## 2024-05-16 NOTE — CARE COORDINATION
Date/Time:  5/16/2024 11:41 AM  2ND Attempted to reach patient by telephone. Call within 2 business days of discharge: Yes,  Left HIPPA compliant message requesting a return call.   CTN will close out CTN/COVID -19 Monitoring episode at this time.    Thank You,    Sussy Phelps RN  Care Transition Coordinator  Contact Number:[phone]

## 2024-05-17 DIAGNOSIS — R79.9 ELEVATED BUN: ICD-10-CM

## 2024-05-17 DIAGNOSIS — R94.4 DECREASED GFR: ICD-10-CM

## 2024-05-17 LAB
ALBUMIN SERPL-MCNC: 4.2 G/DL (ref 3.4–5)
ANION GAP SERPL CALCULATED.3IONS-SCNC: 11 MMOL/L (ref 3–16)
BUN SERPL-MCNC: 20 MG/DL (ref 7–20)
CALCIUM SERPL-MCNC: 9.7 MG/DL (ref 8.3–10.6)
CHLORIDE SERPL-SCNC: 105 MMOL/L (ref 99–110)
CO2 SERPL-SCNC: 26 MMOL/L (ref 21–32)
CREAT SERPL-MCNC: 0.9 MG/DL (ref 0.6–1.2)
GFR SERPLBLD CREATININE-BSD FMLA CKD-EPI: 68 ML/MIN/{1.73_M2}
GLUCOSE SERPL-MCNC: 90 MG/DL (ref 70–99)
PHOSPHATE SERPL-MCNC: 3.3 MG/DL (ref 2.5–4.9)
POTASSIUM SERPL-SCNC: 4.4 MMOL/L (ref 3.5–5.1)
SODIUM SERPL-SCNC: 142 MMOL/L (ref 136–145)

## 2024-05-21 DIAGNOSIS — R42 DIZZINESS: Primary | ICD-10-CM

## 2024-10-05 SDOH — HEALTH STABILITY: PHYSICAL HEALTH: ON AVERAGE, HOW MANY MINUTES DO YOU ENGAGE IN EXERCISE AT THIS LEVEL?: 50 MIN

## 2024-10-05 SDOH — HEALTH STABILITY: PHYSICAL HEALTH: ON AVERAGE, HOW MANY DAYS PER WEEK DO YOU ENGAGE IN MODERATE TO STRENUOUS EXERCISE (LIKE A BRISK WALK)?: 6 DAYS

## 2024-10-05 ASSESSMENT — LIFESTYLE VARIABLES
HOW OFTEN DURING THE LAST YEAR HAVE YOU HAD A FEELING OF GUILT OR REMORSE AFTER DRINKING: NEVER
HOW OFTEN DO YOU HAVE A DRINK CONTAINING ALCOHOL: 5
HOW OFTEN DURING THE LAST YEAR HAVE YOU FAILED TO DO WHAT WAS NORMALLY EXPECTED FROM YOU BECAUSE OF DRINKING: NEVER
HOW OFTEN DO YOU HAVE A DRINK CONTAINING ALCOHOL: 4 OR MORE TIMES A WEEK
HAS A RELATIVE, FRIEND, DOCTOR, OR ANOTHER HEALTH PROFESSIONAL EXPRESSED CONCERN ABOUT YOUR DRINKING OR SUGGESTED YOU CUT DOWN: NO
HOW MANY STANDARD DRINKS CONTAINING ALCOHOL DO YOU HAVE ON A TYPICAL DAY: 1
HOW OFTEN DURING THE LAST YEAR HAVE YOU FAILED TO DO WHAT WAS NORMALLY EXPECTED FROM YOU BECAUSE OF DRINKING: NEVER
HAVE YOU OR SOMEONE ELSE BEEN INJURED AS A RESULT OF YOUR DRINKING: NO
HAVE YOU OR SOMEONE ELSE BEEN INJURED AS A RESULT OF YOUR DRINKING: NO
HOW OFTEN DURING THE LAST YEAR HAVE YOU BEEN UNABLE TO REMEMBER WHAT HAPPENED THE NIGHT BEFORE BECAUSE YOU HAD BEEN DRINKING: NEVER
HOW OFTEN DURING THE LAST YEAR HAVE YOU FOUND THAT YOU WERE NOT ABLE TO STOP DRINKING ONCE YOU HAD STARTED: NEVER
HAS A RELATIVE, FRIEND, DOCTOR, OR ANOTHER HEALTH PROFESSIONAL EXPRESSED CONCERN ABOUT YOUR DRINKING OR SUGGESTED YOU CUT DOWN: NO
HOW MANY STANDARD DRINKS CONTAINING ALCOHOL DO YOU HAVE ON A TYPICAL DAY: 1 OR 2
HOW OFTEN DURING THE LAST YEAR HAVE YOU HAD A FEELING OF GUILT OR REMORSE AFTER DRINKING: NEVER
HOW OFTEN DURING THE LAST YEAR HAVE YOU NEEDED AN ALCOHOLIC DRINK FIRST THING IN THE MORNING TO GET YOURSELF GOING AFTER A NIGHT OF HEAVY DRINKING: NEVER
HOW OFTEN DO YOU HAVE SIX OR MORE DRINKS ON ONE OCCASION: 1
HOW OFTEN DURING THE LAST YEAR HAVE YOU FOUND THAT YOU WERE NOT ABLE TO STOP DRINKING ONCE YOU HAD STARTED: NEVER
HOW OFTEN DURING THE LAST YEAR HAVE YOU BEEN UNABLE TO REMEMBER WHAT HAPPENED THE NIGHT BEFORE BECAUSE YOU HAD BEEN DRINKING: NEVER
HOW OFTEN DURING THE LAST YEAR HAVE YOU NEEDED AN ALCOHOLIC DRINK FIRST THING IN THE MORNING TO GET YOURSELF GOING AFTER A NIGHT OF HEAVY DRINKING: NEVER

## 2024-10-05 ASSESSMENT — PATIENT HEALTH QUESTIONNAIRE - PHQ9
1. LITTLE INTEREST OR PLEASURE IN DOING THINGS: NOT AT ALL
SUM OF ALL RESPONSES TO PHQ QUESTIONS 1-9: 0
SUM OF ALL RESPONSES TO PHQ9 QUESTIONS 1 & 2: 0
2. FEELING DOWN, DEPRESSED OR HOPELESS: NOT AT ALL
SUM OF ALL RESPONSES TO PHQ QUESTIONS 1-9: 0

## 2024-10-08 ENCOUNTER — OFFICE VISIT (OUTPATIENT)
Dept: FAMILY MEDICINE CLINIC | Age: 74
End: 2024-10-08

## 2024-10-08 VITALS
DIASTOLIC BLOOD PRESSURE: 70 MMHG | BODY MASS INDEX: 26.16 KG/M2 | OXYGEN SATURATION: 96 % | SYSTOLIC BLOOD PRESSURE: 136 MMHG | HEART RATE: 75 BPM | HEIGHT: 65 IN | RESPIRATION RATE: 16 BRPM | WEIGHT: 157 LBS

## 2024-10-08 DIAGNOSIS — Z00.00 ANNUAL WELLNESS VISIT: Primary | ICD-10-CM

## 2024-10-08 DIAGNOSIS — N18.31 STAGE 3A CHRONIC KIDNEY DISEASE (HCC): ICD-10-CM

## 2024-10-08 DIAGNOSIS — D64.9 ANEMIA, UNSPECIFIED TYPE: ICD-10-CM

## 2024-10-08 DIAGNOSIS — R94.4 DECREASED GFR: ICD-10-CM

## 2024-10-08 DIAGNOSIS — E78.5 HYPERLIPIDEMIA, UNSPECIFIED HYPERLIPIDEMIA TYPE: ICD-10-CM

## 2024-10-08 DIAGNOSIS — G47.33 OSA ON CPAP: ICD-10-CM

## 2024-10-08 SDOH — ECONOMIC STABILITY: INCOME INSECURITY: HOW HARD IS IT FOR YOU TO PAY FOR THE VERY BASICS LIKE FOOD, HOUSING, MEDICAL CARE, AND HEATING?: NOT HARD AT ALL

## 2024-10-08 SDOH — ECONOMIC STABILITY: FOOD INSECURITY: WITHIN THE PAST 12 MONTHS, THE FOOD YOU BOUGHT JUST DIDN'T LAST AND YOU DIDN'T HAVE MONEY TO GET MORE.: NEVER TRUE

## 2024-10-08 SDOH — ECONOMIC STABILITY: FOOD INSECURITY: WITHIN THE PAST 12 MONTHS, YOU WORRIED THAT YOUR FOOD WOULD RUN OUT BEFORE YOU GOT MONEY TO BUY MORE.: NEVER TRUE

## 2024-10-08 NOTE — PROGRESS NOTES
Medicare Annual Wellness Visit    Uyen Ramos is here for Medicare AWV (Patient is here for an AWV)    Assessment & Plan   Annual wellness visit  Anemia, unspecified type  -     CBC with Auto Differential; Future  -     Iron and TIBC; Future  -     Ferritin; Future  Hyperlipidemia, unspecified hyperlipidemia type  -     Lipid Panel; Future  -     Comprehensive Metabolic Panel; Future  Decreased GFR  -     Microalbumin / Creatinine Urine Ratio; Future  -     Comprehensive Metabolic Panel; Future  ARTEMIO on CPAP  Stage 3a chronic kidney disease (HCC)    Doing well  Will ck labs    Follow up 6 months        Recommendations for Preventive Services Due: see orders and patient instructions/AVS.  Recommended screening schedule for the next 5-10 years is provided to the patient in written form: see Patient Instructions/AVS.     No follow-ups on file.     Subjective   Had angiogram this year with DR Lazo at [facility]  And was fine  Still walking  Tries to walk  3-4 miles a day  This week a death in the family  Says she does yoga    Bp little high today  Had kids at the house today  Donates blood every 8 week  Bp always good    Does not take calcium  Thinks she gets enough in diet    On simvastatin  20mg    Both stress and urge incontinence  Sometimes wears a pad    Cognitive  Says she forgets a word and then it comes to her  Has a cpap  for a couple of years        Patient's complete Health Risk Assessment and screening values have been reviewed and are found in Flowsheets. The following problems were reviewed today and where indicated follow up appointments were made and/or referrals ordered.    Positive Risk Factor Screenings with Interventions:    Fall Risk:  Do you feel unsteady or are you worried about falling? : no  2 or more falls in past year?: no  Fall with injury in past year?: (!) yes     Interventions:                           Was climbing up a step ladder and fell  Was hit in the head with a

## 2024-10-14 ENCOUNTER — HOSPITAL ENCOUNTER (OUTPATIENT)
Dept: ONCOLOGY | Age: 74
Setting detail: INFUSION SERIES
Discharge: HOME | End: 2024-10-14
Payer: MEDICARE

## 2024-10-14 DIAGNOSIS — R94.4 DECREASED GFR: ICD-10-CM

## 2024-10-14 DIAGNOSIS — D70.9 NEUTROPENIA, UNSPECIFIED TYPE (HCC): Primary | ICD-10-CM

## 2024-10-14 DIAGNOSIS — E78.5 HYPERLIPIDEMIA, UNSPECIFIED HYPERLIPIDEMIA TYPE: ICD-10-CM

## 2024-10-14 DIAGNOSIS — D64.9 ANEMIA, UNSPECIFIED TYPE: ICD-10-CM

## 2024-10-14 LAB
ALBUMIN SERPL-MCNC: 4.3 G/DL (ref 3.4–5)
ALBUMIN/GLOB SERPL: 1.7 {RATIO} (ref 1.1–2.2)
ALP SERPL-CCNC: 76 U/L (ref 40–129)
ALT SERPL-CCNC: 16 U/L (ref 10–40)
ANION GAP SERPL CALCULATED.3IONS-SCNC: 10 MMOL/L (ref 3–16)
AST SERPL-CCNC: 24 U/L (ref 15–37)
BASOPHILS # BLD: 0.1 K/UL (ref 0–0.2)
BASOPHILS NFR BLD: 2 %
BILIRUB SERPL-MCNC: 0.3 MG/DL (ref 0–1)
BUN SERPL-MCNC: 18 MG/DL (ref 7–20)
CALCIUM SERPL-MCNC: 9.3 MG/DL (ref 8.3–10.6)
CHLORIDE SERPL-SCNC: 105 MMOL/L (ref 99–110)
CHOLEST SERPL-MCNC: 191 MG/DL (ref 0–199)
CO2 SERPL-SCNC: 25 MMOL/L (ref 21–32)
CREAT SERPL-MCNC: 0.9 MG/DL (ref 0.6–1.2)
DEPRECATED RDW RBC AUTO: 13.5 % (ref 12.4–15.4)
EOSINOPHIL # BLD: 0.2 K/UL (ref 0–0.6)
EOSINOPHIL NFR BLD: 5.9 %
FERRITIN SERPL IA-MCNC: 28.4 NG/ML (ref 15–150)
GFR SERPLBLD CREATININE-BSD FMLA CKD-EPI: 67 ML/MIN/{1.73_M2}
GLUCOSE SERPL-MCNC: 97 MG/DL (ref 70–99)
HCT VFR BLD AUTO: 39.6 % (ref 36–48)
HDLC SERPL-MCNC: 82 MG/DL (ref 40–60)
HGB BLD-MCNC: 13.1 G/DL (ref 12–16)
IRON SATN MFR SERPL: 22 % (ref 15–50)
IRON SERPL-MCNC: 78 UG/DL (ref 37–145)
LDLC SERPL CALC-MCNC: 99 MG/DL
LYMPHOCYTES # BLD: 1.3 K/UL (ref 1–5.1)
LYMPHOCYTES NFR BLD: 38 %
MCH RBC QN AUTO: 30.4 PG (ref 26–34)
MCHC RBC AUTO-ENTMCNC: 33.2 G/DL (ref 31–36)
MCV RBC AUTO: 91.5 FL (ref 80–100)
MONOCYTES # BLD: 0.3 K/UL (ref 0–1.3)
MONOCYTES NFR BLD: 7.7 %
NEUTROPHILS # BLD: 1.6 K/UL (ref 1.7–7.7)
NEUTROPHILS NFR BLD: 46.4 %
PLATELET # BLD AUTO: 163 K/UL (ref 135–450)
PMV BLD AUTO: 8.7 FL (ref 5–10.5)
POTASSIUM SERPL-SCNC: 4 MMOL/L (ref 3.5–5.1)
PROT SERPL-MCNC: 6.9 G/DL (ref 6.4–8.2)
RBC # BLD AUTO: 4.33 M/UL (ref 4–5.2)
SODIUM SERPL-SCNC: 140 MMOL/L (ref 136–145)
TIBC SERPL-MCNC: 348 UG/DL (ref 260–445)
TRIGL SERPL-MCNC: 50 MG/DL (ref 0–150)
VLDLC SERPL CALC-MCNC: 10 MG/DL
WBC # BLD AUTO: 3.5 K/UL (ref 4–11)

## 2024-10-14 PROCEDURE — 85025 COMPLETE CBC W/AUTO DIFF WBC: CPT

## 2024-10-14 PROCEDURE — 82728 ASSAY OF FERRITIN: CPT

## 2024-10-14 PROCEDURE — 83550 IRON BINDING TEST: CPT

## 2024-10-14 PROCEDURE — 83540 ASSAY OF IRON: CPT

## 2024-10-14 PROCEDURE — 80061 LIPID PANEL: CPT

## 2024-10-14 PROCEDURE — 80053 COMPREHEN METABOLIC PANEL: CPT

## 2024-11-25 ENCOUNTER — HOSPITAL ENCOUNTER (OUTPATIENT)
Dept: ONCOLOGY | Age: 74
Setting detail: INFUSION SERIES
Discharge: HOME | End: 2024-11-25
Payer: MEDICARE

## 2024-11-25 DIAGNOSIS — D70.9 NEUTROPENIA, UNSPECIFIED TYPE (HCC): ICD-10-CM

## 2024-11-25 LAB
BASOPHILS # BLD: 0.1 K/UL (ref 0–0.2)
BASOPHILS NFR BLD: 1.9 %
DEPRECATED RDW RBC AUTO: 13.5 % (ref 12.4–15.4)
EOSINOPHIL # BLD: 0.2 K/UL (ref 0–0.6)
EOSINOPHIL NFR BLD: 4.4 %
HCT VFR BLD AUTO: 38.1 % (ref 36–48)
HGB BLD-MCNC: 12.9 G/DL (ref 12–16)
LYMPHOCYTES # BLD: 1.3 K/UL (ref 1–5.1)
LYMPHOCYTES NFR BLD: 35.1 %
MCH RBC QN AUTO: 30.5 PG (ref 26–34)
MCHC RBC AUTO-ENTMCNC: 33.8 G/DL (ref 31–36)
MCV RBC AUTO: 90.4 FL (ref 80–100)
MONOCYTES # BLD: 0.3 K/UL (ref 0–1.3)
MONOCYTES NFR BLD: 7.6 %
NEUTROPHILS # BLD: 2 K/UL (ref 1.7–7.7)
NEUTROPHILS NFR BLD: 51 %
PLATELET # BLD AUTO: 188 K/UL (ref 135–450)
PMV BLD AUTO: 9.1 FL (ref 5–10.5)
RBC # BLD AUTO: 4.22 M/UL (ref 4–5.2)
WBC # BLD AUTO: 3.8 K/UL (ref 4–11)

## 2024-11-25 PROCEDURE — 85025 COMPLETE CBC W/AUTO DIFF WBC: CPT

## 2024-11-27 DIAGNOSIS — D72.819 LEUKOPENIA, UNSPECIFIED TYPE: Primary | ICD-10-CM

## 2024-12-30 ENCOUNTER — OFFICE VISIT (OUTPATIENT)
Dept: FAMILY MEDICINE CLINIC | Age: 74
End: 2024-12-30

## 2024-12-30 VITALS
BODY MASS INDEX: 26.13 KG/M2 | DIASTOLIC BLOOD PRESSURE: 70 MMHG | OXYGEN SATURATION: 94 % | HEART RATE: 86 BPM | WEIGHT: 157 LBS | RESPIRATION RATE: 16 BRPM | SYSTOLIC BLOOD PRESSURE: 122 MMHG

## 2024-12-30 DIAGNOSIS — F43.9 STRESS: ICD-10-CM

## 2024-12-30 DIAGNOSIS — D72.819 LEUKOPENIA, UNSPECIFIED TYPE: ICD-10-CM

## 2024-12-30 DIAGNOSIS — D72.819 LEUKOPENIA, UNSPECIFIED TYPE: Primary | ICD-10-CM

## 2024-12-30 LAB
BASOPHILS # BLD: 0.1 K/UL (ref 0–0.2)
BASOPHILS NFR BLD: 1.2 %
DEPRECATED RDW RBC AUTO: 14.1 % (ref 12.4–15.4)
EOSINOPHIL # BLD: 0.2 K/UL (ref 0–0.6)
EOSINOPHIL NFR BLD: 3.8 %
HCT VFR BLD AUTO: 40.2 % (ref 36–48)
HGB BLD-MCNC: 13.4 G/DL (ref 12–16)
LYMPHOCYTES # BLD: 1.2 K/UL (ref 1–5.1)
LYMPHOCYTES NFR BLD: 24.2 %
MCH RBC QN AUTO: 30.5 PG (ref 26–34)
MCHC RBC AUTO-ENTMCNC: 33.3 G/DL (ref 31–36)
MCV RBC AUTO: 91.5 FL (ref 80–100)
MONOCYTES # BLD: 0.3 K/UL (ref 0–1.3)
MONOCYTES NFR BLD: 6.8 %
NEUTROPHILS # BLD: 3.1 K/UL (ref 1.7–7.7)
NEUTROPHILS NFR BLD: 64 %
PLATELET # BLD AUTO: 178 K/UL (ref 135–450)
PMV BLD AUTO: 8.8 FL (ref 5–10.5)
RBC # BLD AUTO: 4.39 M/UL (ref 4–5.2)
WBC # BLD AUTO: 4.8 K/UL (ref 4–11)

## 2024-12-30 NOTE — PROGRESS NOTES
Milan Ramos (:  1950) is a 74 y.o. female, here for evaluation of the following chief complaint(s):  Discuss Labs (Patient is here to discuss recent lab results. CBC from 24) and Congestion (Patient stated she's had a cold for a couple days. )      Assessment & Plan     Assessment/Plan  Uyen \"Rosalinda Cole\" was seen today for discuss labs and congestion.    Diagnoses and all orders for this visit:    Leukopenia, unspecified type  -     CBC with Auto Differential; Future    Stress    I went  over several white counts with Rosalinda Cole.  I at this time I am not concerned about her very mild leukopenia.  I will repeat again today.  Her daughter works in the bone marrow transplant unit.  I think her daughter was concerned.  I explained that we rarely find anything significant going on when we see a slightly low white count.  I cannot guarantee that nothing else is going on but it is not very likely.  I could send her to hematology for an evaluation but I think she is happy now just watching it she is very stressed with a couple of her children who have medical conditions she has been helping her son who had a traumatic brain injury and he is very dependent on help.  Her daughter has MS.  I suggested she talk to Wendy and she is very interested in doing this if nobody has availability.  Otherwise I can give her another referral for counseling.  She is not very interested in trying a medication but I did briefly discuss Zoloft.  Follow-up in 6 months    No orders of the defined types were placed in this encounter.     Time   32 min  Return in about 6 months (around 2025).         Subjective   SUBJECTIVE/OBJECTIVE:  HPI  Here to go over CBC  Has leukopenia  Daughter is a bone marrow transplant nurse  Daughter is a nervous person    Feeling well  Stress at home  Has 2 children with serious illnesses    Daughter with ms  Says she does not understand    Anxious  Not depressed  Tired          Hemoglobin

## 2024-12-30 NOTE — PATIENT INSTRUCTIONS
[facility] podcast  Exercise is Medicine, But Are We Taking it?  Dr Blu Stephens M.D.  27 min

## 2025-01-22 DIAGNOSIS — E78.5 HYPERLIPIDEMIA, UNSPECIFIED HYPERLIPIDEMIA TYPE: ICD-10-CM

## 2025-01-22 RX ORDER — SIMVASTATIN 20 MG
TABLET ORAL
Qty: 90 TABLET | Refills: 2 | Status: SHIPPED | OUTPATIENT
Start: 2025-01-22

## 2025-02-18 ENCOUNTER — TELEPHONE (OUTPATIENT)
Dept: FAMILY MEDICINE CLINIC | Age: 75
End: 2025-02-18

## 2025-02-18 DIAGNOSIS — Z12.11 SCREENING FOR COLON CANCER: Primary | ICD-10-CM

## 2025-02-18 NOTE — TELEPHONE ENCOUNTER
Aedemetrio called and wants to know if we can order a colo guard for the pt or a fit test?  Please advise

## 2025-02-19 NOTE — TELEPHONE ENCOUNTER
Is it OK to order a fit test? Pls advise if you would prefer the patient to get a colonoscopy. Pt last fit test was in 2020

## 2025-03-03 ENCOUNTER — HOSPITAL ENCOUNTER (OUTPATIENT)
Dept: MAMMOGRAPHY | Age: 75
Discharge: HOME | End: 2025-03-03
Payer: MEDICARE

## 2025-03-03 VITALS — WEIGHT: 157 LBS | BODY MASS INDEX: 26.16 KG/M2 | HEIGHT: 65 IN

## 2025-03-03 DIAGNOSIS — Z12.31 VISIT FOR SCREENING MAMMOGRAM: ICD-10-CM

## 2025-03-03 PROCEDURE — 77063 BREAST TOMOSYNTHESIS BI: CPT

## 2025-04-14 ENCOUNTER — HOSPITAL ENCOUNTER (OUTPATIENT)
Dept: ONCOLOGY | Age: 75
Setting detail: INFUSION SERIES
Discharge: HOME | End: 2025-04-14
Payer: MEDICARE

## 2025-04-14 ENCOUNTER — RESULTS FOLLOW-UP (OUTPATIENT)
Dept: FAMILY MEDICINE CLINIC | Age: 75
End: 2025-04-14

## 2025-04-14 DIAGNOSIS — D72.819 LEUKOPENIA, UNSPECIFIED TYPE: ICD-10-CM

## 2025-04-14 LAB
BASOPHILS # BLD: 0.1 K/UL (ref 0–0.2)
BASOPHILS NFR BLD: 1.4 %
DEPRECATED RDW RBC AUTO: 13.5 % (ref 12.4–15.4)
EOSINOPHIL # BLD: 0.1 K/UL (ref 0–0.6)
EOSINOPHIL NFR BLD: 1.7 %
HCT VFR BLD AUTO: 39.4 % (ref 36–48)
HGB BLD-MCNC: 13.2 G/DL (ref 12–16)
LYMPHOCYTES # BLD: 1.6 K/UL (ref 1–5.1)
LYMPHOCYTES NFR BLD: 26.8 %
MCH RBC QN AUTO: 30.3 PG (ref 26–34)
MCHC RBC AUTO-ENTMCNC: 33.4 G/DL (ref 31–36)
MCV RBC AUTO: 90.5 FL (ref 80–100)
MONOCYTES # BLD: 0.3 K/UL (ref 0–1.3)
MONOCYTES NFR BLD: 4.8 %
NEUTROPHILS # BLD: 3.8 K/UL (ref 1.7–7.7)
NEUTROPHILS NFR BLD: 65.3 %
PLATELET # BLD AUTO: 178 K/UL (ref 135–450)
PMV BLD AUTO: 8.9 FL (ref 5–10.5)
RBC # BLD AUTO: 4.35 M/UL (ref 4–5.2)
WBC # BLD AUTO: 5.8 K/UL (ref 4–11)

## 2025-04-14 PROCEDURE — 85025 COMPLETE CBC W/AUTO DIFF WBC: CPT
